# Patient Record
Sex: FEMALE | Race: WHITE | NOT HISPANIC OR LATINO | Employment: UNEMPLOYED | URBAN - METROPOLITAN AREA
[De-identification: names, ages, dates, MRNs, and addresses within clinical notes are randomized per-mention and may not be internally consistent; named-entity substitution may affect disease eponyms.]

---

## 2017-02-07 ENCOUNTER — ALLSCRIPTS OFFICE VISIT (OUTPATIENT)
Dept: OTHER | Facility: OTHER | Age: 62
End: 2017-02-07

## 2017-03-03 ENCOUNTER — LAB REQUISITION (OUTPATIENT)
Dept: LAB | Facility: HOSPITAL | Age: 62
End: 2017-03-03
Payer: COMMERCIAL

## 2017-03-03 ENCOUNTER — ALLSCRIPTS OFFICE VISIT (OUTPATIENT)
Dept: OTHER | Facility: OTHER | Age: 62
End: 2017-03-03

## 2017-03-03 DIAGNOSIS — Z01.419 ENCOUNTER FOR GYNECOLOGICAL EXAMINATION WITHOUT ABNORMAL FINDING: ICD-10-CM

## 2017-03-03 DIAGNOSIS — Z12.31 ENCOUNTER FOR SCREENING MAMMOGRAM FOR MALIGNANT NEOPLASM OF BREAST: ICD-10-CM

## 2017-03-03 DIAGNOSIS — Z11.59 ENCOUNTER FOR SCREENING FOR OTHER VIRAL DISEASES: ICD-10-CM

## 2017-03-03 PROCEDURE — 87624 HPV HI-RISK TYP POOLED RSLT: CPT | Performed by: OBSTETRICS & GYNECOLOGY

## 2017-03-03 PROCEDURE — G0145 SCR C/V CYTO,THINLAYER,RESCR: HCPCS | Performed by: OBSTETRICS & GYNECOLOGY

## 2017-03-07 LAB — HPV RRNA GENITAL QL NAA+PROBE: NORMAL

## 2017-03-08 ENCOUNTER — GENERIC CONVERSION - ENCOUNTER (OUTPATIENT)
Dept: OTHER | Facility: OTHER | Age: 62
End: 2017-03-08

## 2017-03-08 LAB
LAB AP GYN PRIMARY INTERPRETATION: NORMAL
Lab: NORMAL

## 2017-04-24 ENCOUNTER — HOSPITAL ENCOUNTER (OUTPATIENT)
Dept: MAMMOGRAPHY | Facility: HOSPITAL | Age: 62
Discharge: HOME | End: 2017-04-24
Attending: OBSTETRICS & GYNECOLOGY
Payer: COMMERCIAL

## 2017-04-24 DIAGNOSIS — Z12.31 ENCOUNTER FOR SCREENING MAMMOGRAM FOR MALIGNANT NEOPLASM OF BREAST: ICD-10-CM

## 2017-04-24 PROCEDURE — G0202 SCR MAMMO BI INCL CAD: HCPCS

## 2017-09-25 ENCOUNTER — DOCTOR'S OFFICE (OUTPATIENT)
Dept: URBAN - METROPOLITAN AREA CLINIC 137 | Facility: CLINIC | Age: 62
Setting detail: OPHTHALMOLOGY
End: 2017-09-25
Payer: COMMERCIAL

## 2017-09-25 ENCOUNTER — RX ONLY (RX ONLY)
Age: 62
End: 2017-09-25

## 2017-09-25 DIAGNOSIS — H52.4: ICD-10-CM

## 2017-09-25 PROCEDURE — 92004 COMPRE OPH EXAM NEW PT 1/>: CPT | Performed by: OPHTHALMOLOGY

## 2017-09-25 ASSESSMENT — REFRACTION_CURRENTRX
OD_ADD: +1.50
OS_OVR_VA: 20/
OD_OVR_VA: 20/
OS_SPHERE: +1.00
OD_OVR_VA: 20/
OD_AXIS: 005
OD_CYLINDER: +0.25
OS_AXIS: 149
OS_VPRISM_DIRECTION: PROGS
OS_OVR_VA: 20/
OS_ADD: +1.50
OS_OVR_VA: 20/
OD_VPRISM_DIRECTION: PROGS
OD_SPHERE: +0.75
OD_OVR_VA: 20/
OS_CYLINDER: +0.50

## 2017-09-25 ASSESSMENT — REFRACTION_OUTSIDERX
OS_SPHERE: +0.75
OD_ADD: +2.00
OD_SPHERE: +0.75
OS_VA1: 20/20
OD_VA2: 20/20(J1+)
OS_CYLINDER: +0.75
OS_VA3: 20/
OD_AXIS: 006
OS_AXIS: 162
OD_CYLINDER: +0.50
OU_VA: 20/
OS_ADD: +2.00
OD_VA1: 20/20
OS_VA2: 20/20(J1+)
OD_VA3: 20/

## 2017-09-25 ASSESSMENT — KERATOMETRY
OD_K1POWER_DIOPTERS: 43.75
OS_K1POWER_DIOPTERS: 43.75
OS_AXISANGLE_DEGREES: 133
OD_AXISANGLE_DEGREES: 090
OD_K2POWER_DIOPTERS: 43.75
OS_K2POWER_DIOPTERS: 44.00

## 2017-09-25 ASSESSMENT — REFRACTION_MANIFEST
OD_VA3: 20/
OS_VA1: 20/
OD_VA1: 20/
OD_VA1: 20/
OU_VA: 20/
OD_VA2: 20/
OS_VA1: 20/
OS_VA3: 20/
OS_VA2: 20/
OD_VA2: 20/
OU_VA: 20/
OS_VA2: 20/
OD_VA3: 20/
OS_VA3: 20/

## 2017-09-25 ASSESSMENT — AXIALLENGTH_DERIVED
OD_AL: 22.9806
OS_AL: 22.8454

## 2017-09-25 ASSESSMENT — REFRACTION_AUTOREFRACTION
OS_AXIS: 174
OS_CYLINDER: +0.75
OD_CYLINDER: +0.75
OS_SPHERE: +1.25
OD_AXIS: 004
OD_SPHERE: +1.00

## 2017-09-25 ASSESSMENT — VISUAL ACUITY
OD_BCVA: 20/20-2
OS_BCVA: 20/30

## 2017-09-25 ASSESSMENT — CONFRONTATIONAL VISUAL FIELD TEST (CVF)
OD_FINDINGS: FULL
OS_FINDINGS: FULL

## 2017-09-25 ASSESSMENT — SPHEQUIV_DERIVED
OD_SPHEQUIV: 1.375
OS_SPHEQUIV: 1.625

## 2017-10-05 ENCOUNTER — DOCTOR'S OFFICE (OUTPATIENT)
Dept: URBAN - METROPOLITAN AREA CLINIC 137 | Facility: CLINIC | Age: 62
Setting detail: OPHTHALMOLOGY
End: 2017-10-05
Payer: COMMERCIAL

## 2017-10-05 DIAGNOSIS — H52.4: ICD-10-CM

## 2017-10-05 PROCEDURE — 92310 CONTACT LENS FITTING OU: CPT | Performed by: OPHTHALMOLOGY

## 2017-10-05 ASSESSMENT — REFRACTION_MANIFEST
OS_VA1: 20/
OU_VA: 20/
OD_VA1: 20/
OS_VA1: 20/
OD_VA3: 20/
OD_VA1: 20/
OS_VA2: 20/
OD_VA2: 20/
OU_VA: 20/
OS_VA2: 20/
OS_VA3: 20/
OD_VA3: 20/
OS_VA3: 20/
OD_VA2: 20/

## 2017-10-05 ASSESSMENT — REFRACTION_OUTSIDERX
OD_VA2: 20/20(J1+)
OD_CYLINDER: +0.50
OS_VA1: 20/20
OD_SPHERE: +0.75
OS_ADD: +2.00
OS_VA3: 20/
OD_VA3: 20/
OD_VA1: 20/20
OD_AXIS: 006
OS_VA2: 20/20(J1+)
OS_CYLINDER: +0.75
OS_AXIS: 162
OD_ADD: +2.00
OS_SPHERE: +0.75
OU_VA: 20/

## 2017-10-05 ASSESSMENT — REFRACTION_CURRENTRX
OD_AXIS: 005
OD_VPRISM_DIRECTION: PROGS
OD_CYLINDER: +0.25
OD_OVR_VA: 20/
OD_ADD: +1.50
OD_OVR_VA: 20/
OS_ADD: +1.50
OS_CYLINDER: +0.50
OS_VPRISM_DIRECTION: PROGS
OS_SPHERE: +1.00
OS_OVR_VA: 20/
OS_AXIS: 149
OD_SPHERE: +0.75
OS_OVR_VA: 20/
OS_OVR_VA: 20/
OD_OVR_VA: 20/

## 2017-10-05 ASSESSMENT — VISUAL ACUITY
OS_BCVA: 20/20
OD_BCVA: 20/30

## 2017-10-05 ASSESSMENT — REFRACTION_AUTOREFRACTION
OD_CYLINDER: +0.75
OS_CYLINDER: +0.75
OS_SPHERE: +1.25
OD_SPHERE: +1.00
OS_AXIS: 174
OD_AXIS: 004

## 2017-10-05 ASSESSMENT — SPHEQUIV_DERIVED
OS_SPHEQUIV: 1.625
OD_SPHEQUIV: 1.375

## 2018-01-11 NOTE — H&P
Demographics     Zip Code: [address]     Admission Date: 3/22/2016     Procedure: Endovenous ablation therapy of incompetent LL veins     Discharge Status: Alive  Risk Factors     Weight Height Units: US (pound - inch)  Procedure     Primary Indication: Varicose veins of lower extremities with Pain  [454 8]     Procedure Side: Right     Procedure Duration: 0     Urgency: Elective

## 2018-01-11 NOTE — PROGRESS NOTES
Preliminary Nursing Report                Patient Information    Initial Encounter Entry Date:   3/2/2016 1:33 PM EST (Automated Transmission Automated Transmission)       Last Modified:   {Олег Balderrama}              Legal Name: Tayler Saucedo Number:        YOB: 1955        Age (years): 64        Gender: F        Body Mass Index (BMI): 28 kg/m2        Height: 63 in  Weight: 160 lbs (73 kgs)           Address:   [place] [id number]               Phone: [phone]        Email:        Ethnicity: Decline to State        Amish:        Marital Status:        Preferred Language: English        Race: Other Race                    Patient Insurance Information        Primary Insurance Information Carrier Name: {Primary  CarrierName}           Carrier Address:   {Primary  CarrierAddress}              Carrier Phone: {Primary  CarrierPhone}          Group Number: {Primary  GroupNumber}          Policy Number: {Primary  PolicyNumber}          Insured Name: {Primary  InsuredName}          Insured : {Primary  InsuredDOB}          Relationship to Insured: {Primary  RelationshiptoInsured}           Secondary Insurance Information Carrier Name: {Secondary  CarrierName}           Carrier Address:   {Secondary  CarrierAddress}              Carrier Phone: {Secondary  CarrierPhone}          Group Number: {Secondary  GroupNumber}          Policy Number: {Secondary  PolicyNumber}          Insured Name: {Secondary  InsuredName}          Insured : {Secondary  InsuredDOB}          Relationship to Insured: {Secondary  RelationshiptoInsured}                       Health Profile   Booking #:   Landon Noel #: [id number]               DOS: 2016    Surgery : PHLEB VEINS - EXTREM - TO 20    Add'l Procedures/Notes:     Surgery Risk: Minor          Precautions          Allergies    No Known Drug Allergies             Medications    Aspirin 81 MG Oral Tablet       B Complex Oral Capsule       Benadryl TABS       Citrucel Oral Powder       Famciclovir 500 MG Oral Tablet       Krill Oil CAPS       Osteo Bi-Flex Adv Joint Shield Oral Tablet       Oxycodone-Acetaminophen 5-325 MG Oral Tablet       Ranitidine HCl - 150 MG Oral Tablet       Vagifem 10 MCG Vaginal Tablet       Zoster (Zostavax)               Conditions    Atrophic vaginitis       GERD (gastroesophageal reflux disease)       Osteoarthritis       Postoperative state       Varicose veins of both lower extremities with other complications       Visit for screening mammogram       Well female exam with routine gynecological exam               Family History    None             Surgical History    None             Social History    Being A Social Drinker       Caffeine Use       Current Every Day Smoker       Current Smoker       Exercising Regularly                               Patient Instructions       ? NPO Instructions   The day before surgery it is recommended to have a light dinner at your usual time and you are allowed a light snack early in the evening  Do not eat anything heavy or eat a big meal after 7pm  Do not eat or drink anything after midnight prior to your surgery  If you are supposed to take any of your medications, do so with a sip of water  Failure to follow these instructions can lead to an increased risk of lung complications and may result in a delay or cancellation of your procedure  If you have any questions, contact your institution for further instructions  No candy, no gum, no mints, no chewing tobacco   Triggered by: Medical Procedure Risk         ? Antacids 34, 35  Please continue to take this medication on your normal schedule  If this is an oral medication and you take in the morning, you may do so with a sip of water  Triggered by: Ranitidine HCl - 150 MG Oral Tablet         ?  Aspirin (Blood Thinners) 112, 104, 105, 114, 113, 103, 110, 107, 108, 109, 111, 106  Please continue to take this medication on your normal schedule  If this is an oral medication and you take in the morning, you may do so with a sip of water  However, your surgeon may have you stop aspirin up to a week early if you are having intracranial, middle ear, posterior eye, spine surgery or prostate surgery  Triggered by: Aspirin 81 MG Oral Tablet         ? Opioids (Pain Medication) 62  Please continue the following medications, if needed, up to and including the day of surgery (with a sip of water)  Triggered by: Oxycodone-Acetaminophen 5-325 MG Oral Tablet         ? Smoking Cessation   Smoking before and after surgery can lead to complications  Patients who quit smoking at least eight weeks before surgery have complication rates almost as low as non-smokers  Smokers who can stop smoking 24 or 48 hours before surgery may also benefit from decreased amounts of nicotine and carbon monoxide in the body  For help quitting smoking, speak with your physician or contact the [facility] Mayela Colin or American [employer] Association  Please visit the following web address for assistance with quitting  [employer] be  [email or web address]-Topics/Qeh-Stc-pr-Quit-Smoking  aspx  Triggered by: Current Smoker, Current Every Day Smoker               Testing Considerations       No recommendations for this classification  Consultations       ? Pain Management Notes client, client  The patient has listed conditions or takes medication that may affect their pain management  Triggered by: Oxycodone-Acetaminophen 5-325 MG Oral Tablet, Age or Facility Rec               Miscellaneous Questions         Question: Are you able to walk up a flight of stairs, walk up a hill or do heavy housework WITHOUT having chest pain or shortness of breath? Answer: YES                   Allergies/Conditions/Medications Not Found        The following were not recognized by our system when generating the recommendations   Please consider if this would impact any preoperative protocols  ? Being A Social Drinker       ? Caffeine Use       ? Exercising Regularly       ? Visit for screening mammogram       ? Benadryl TABS       ? Yvonne Harps CAPS       ? Osteo Bi-Flex Adv Joint Shield Oral Tablet       ? Zoster (Zostavax)                  Appointment Information         Date:    03/22/2016        Location:    [facility]        Address:           Directions:                      Footnotes revision 14      ?? Denotes a free-text entry  Legal Disclaimer: Any and all recommendations and services provided herein are designed to assist in the preoperative care of the patient  Nothing contained herein is designed to replace, eliminate or alleviate the responsibility of the attending physician to supervise and determine the patient?s preoperative care and course of treatment  Failure to provide complete, accurate information may negatively impact the system?s ability to recommend the proper preoperative protocol  THE ATTENDING PHYSICIAN IS RESPONSIBLE TO REVIEW THE SUGGESTED PREOPERATIVE PROTOCOLS/COURSE OF TREATMENT AND PRESCRIBE THE FINAL COURSE OF PREOPERATIVE TREATMENT IN CONSULTATION WITH THE PATIENT  THE ePREOP SYSTEM AND ITS MATERIALS ARE PROVIDED ? AS IS? WITHOUT WARRANTY OF ANY KIND, EXPRESS OR IMPLIED, INCLUDING, BUT NOT LIMITED TO, WARRANTIES OF PERFORMANCE OR MERCHANTABILITY OR FITNESS FOR A PARTICULAR PURPOSE  PATIENT AND PHYSICIANS HEREBY AGREE THAT THEIR USE OF THE MATERIALS AND RESOURCES ACT AS A CONSENT TO RELEASE AND WAIVE ePREOP FROM ANY AND ALL CLAIMS OF WARRANTY, TORT OR CONTRACT LAW OF ANY KIND             Electronically signed by:Melo Shaffer MD  Mar  7 2016  5:45AM EST

## 2018-01-13 VITALS
SYSTOLIC BLOOD PRESSURE: 124 MMHG | BODY MASS INDEX: 31.71 KG/M2 | DIASTOLIC BLOOD PRESSURE: 68 MMHG | HEIGHT: 63 IN | WEIGHT: 179 LBS | HEART RATE: 76 BPM

## 2018-01-13 VITALS
SYSTOLIC BLOOD PRESSURE: 130 MMHG | RESPIRATION RATE: 16 BRPM | HEART RATE: 88 BPM | BODY MASS INDEX: 31.92 KG/M2 | WEIGHT: 180.13 LBS | DIASTOLIC BLOOD PRESSURE: 70 MMHG | HEIGHT: 63 IN

## 2018-01-14 NOTE — RESULT NOTES
Verified Results  (1) THIN PREP PAP WITH IMAGING [id number] 10:00AM Mirza Palm Order Number: [id number]     Test Name Result Flag Reference   LAB AP CASE REPORT (Report)     Gynecologic Cytology Report            Case: [id number]                  Authorizing Provider: Faisal Medeiros MD  Collected:      03/03/2017 1000        First Screen:     KULWINDER Jones   Received:      03/06/2017 3685        Specimen:  LIQUID-BASED PAP, SCREENING, Endocervical   HPV HIGH RISK RESULT (Report)     HPV, High Risk: HPV NEG, HPV16 NEG, HPV18 NEG      Other High Risk HPV Negative, HPV 16 Negative, HPV 18 Negative  HPV types: 16,18,31,33,35,39,45,51,52,56,58,59,66 and 68 DNA are undetectable or below the pre-set threshold  Roche???s FDA approved Georgi 4800 is utilized with strict adherence to the ???s instruction  manual to test for the presence of High-Risk HPV DNA, as well as HPV 16 and HPV 18  This instrument  has been validated by our laboratory and/or by the   A negative result does not preclude the presence of HPV infection because results depend on adequate  specimen collection, absence of inhibitors and sufficient DNA to be detected  Additionally, HPV negative  results are not intended to prevent women from proceeding to colposcopy if clinically warranted  Positive HPV test results indicate the presence of any one or more of the high risk types, but since patients  are often co-infected with low-risk types it does not rule out the presence of low-risk types in patients  with mixed infections  LAB AP GYN PRIMARY INTERPRETATION      Negative for intraepithelial lesion or malignancy  Electronically signed by KULWINDER Jones on 3/8/2017 at 2:27 PM   LAB AP GYN SPECIMEN ADEQUACY      Satisfactory for evaluation  Endocervical/transformation zone component present     LAB AP GYN ADDITIONAL INFORMATION (Report)     [employer]'s FDA approved ,  and ThinPrep Imaging System are   utilized with strict adherence to the 's instruction manual to   prepare gynecologic and non-gynecologic cytology specimens for the   production of ThinPrep slides as well as for gynecologic ThinPrep imaging  These processes have been validated by our laboratory and/or by the     The Pap test is not a diagnostic procedure and should not be used as the   sole means to detect cervical cancer  It is only a screening procedure to   aid in the detection of cervical cancer and its precursors  Both   false-negative and false-positive results have been experienced  Your   patient's test result should be interpreted in this context together with   the history and clinical findings

## 2018-01-15 NOTE — PROGRESS NOTES
Assessment    1  Encounter for preventive health examination (V70 0) (Z00 00)   2  Screening for lipid disorders (V77 91) (Z13 220)   3  Leg swelling (729 81) (M79 89)    Plan  GERD (gastroesophageal reflux disease)    · Famciclovir 500 MG Oral Tablet  Health Maintenance, Leg swelling    · (1) CBC/PLT/DIFF; Status:Active; Requested for:14Oct2016;    · (1) COMPREHENSIVE METABOLIC PANEL; Status:Active; Requested for:14Oct2016;    · (1) T4, FREE; Status:Active; Requested for:14Oct2016;    · (1) TSH; Status:Active; Requested for:14Oct2016;   Screening for lipid disorders    · (1) LIPID PANEL, FASTING; Status:Active; Requested for:14Oct2016;     Discussion/Summary  health maintenance visit Currently, she eats a healthy diet and has an adequate exercise regimen  the risks and benefits of cervical cancer screening were discussed cervical cancer screening is current cervical cancer screening is managed by [facility] Breast cancer screening: the risks and benefits of breast cancer screening were discussed, mammogram is current and breast cancer screening is managed by [facility]  Colorectal cancer screening: the risks and benefits of colorectal cancer screening were discussed and Declines colonoscopy    Had Cologuard which was negative  Osteoporosis screening: the risks and benefits of osteoporosis screening were discussed  The immunizations are up to date  She was advised to be evaluated by an optometrist  Advice and education were given regarding nutrition, aerobic exercise, weight bearing exercise and weight loss  Patient discussion: discussed with the patient  History of Present Illness  HM, Adult Female: The patient is being seen for a health maintenance evaluation  Social History: Household members include spouse and adult children  She is   Work status: working full time  The patient is a former cigarette smoker  She reports rare alcohol use  She has never used illicit drugs  General Health:  The patient's health since the last visit is described as good  She has regular dental visits  She denies vision problems  She denies hearing loss  Immunizations status: up to date  Lifestyle:  She consumes a diverse and healthy diet  She does not have any weight concerns  She exercises regularly  She does not use tobacco  She denies alcohol use  She denies drug use  Reproductive health:  she reports normal menses  Screening: cancer screening reviewed and current  metabolic screening reviewed and current  risk screening reviewed and current  Review of Systems    Constitutional: recent 15 lb weight gain, feeling tired and Occasionally feels like she is in a fog  "Don't feel sharp", but no fever, not feeling poorly and no chills  Eyes: No complaints of eye pain, no red eyes, no eyesight problems, no discharge, no dry eyes, no itching of eyes  ENT: no complaints of earache, no loss of hearing, no nose bleeds, no nasal discharge, no sore throat, no hoarseness  Cardiovascular: No complaints of slow heart rate, no fast heart rate, no chest pain, no palpitations, no leg claudication, no lower extremity edema  Respiratory: No complaints of shortness of breath, no wheezing, no cough, no SOB on exertion, no orthopnea, no PND  Gastrointestinal: No complaints of abdominal pain, no constipation, no nausea or vomiting, no diarrhea, no bloody stools  Genitourinary: No complaints of dysuria, no incontinence, no pelvic pain, no dysmenorrhea, no vaginal discharge or bleeding  Musculoskeletal: No complaints of arthralgias, no myalgias, no joint swelling or stiffness, no limb pain or swelling  Active Problems    1  Atrophic vaginitis (627 3) (N95 2)   2  GERD (gastroesophageal reflux disease) (530 81) (K21 9)   3  Leg swelling (729 81) (M79 89)   4  Lymphedema (457 1) (I89 0)   5  Osteoarthritis (715 90) (M19 90)   6  Postoperative state (V45 89) (Z98 890)   7   Varicose veins of both lower extremities with other complications (588 8) (P73 858)   8  Visit for screening mammogram (V76 12) (Z12 31)   9  Well female exam with routine gynecological exam (V72 31) (Z01 419)    Past Medical History    · History of Breast pain (611 71) (N64 4)   · History of Endometriosis (617 9) (N80 9)   · History of chest pain (V13 89) (L52 896)    The past medical history was reviewed and updated today  Surgical History    · History of Cervix Cryosurgery   · History of Endovenous Ablation Of Incompetent Vein Laser   · History of Oophorectomy   · History of Stab Phlebectomy Of Varicose Veins 10-20 Stab Incisions   · History of Stab Phlebectomy Varicose Veins Fewer Than 10 Stab Incisions   · History of Tonsillectomy With Adenoidectomy    The surgical history was reviewed and updated today  Family History  Mother    · Family history of Alzheimer Disease   · Family history of Diabetes Mellitus (V18 0)  Maternal Grandmother    · Family history of Diabetes Mellitus (V18 0)  Family History    · Family history of Diabetes Mellitus (V18 0)   · Family history of Heart Disease (V17 49)    The family history was reviewed and updated today  Social History    · Being A Social Drinker   · Caffeine Use   · Denied: History of Current Every Day Smoker   · Denied: History of Current Smoker   · Exercising Regularly   · Former smoker (A92 87) (P79 004)  The social history was reviewed and updated today  The social history was reviewed and is unchanged  Current Meds   1  Famciclovir 500 MG Oral Tablet; take 3 capsules at first sign of fever blister; Therapy: 88KDW9622 to (Last Rx:85Mpw6059)  Requested for: 23Joe9497 Ordered   2  Osteo Bi-Flex Adv Joint Shield Oral Tablet; Therapy: (Recorded:25Nov2013) to Recorded   3  Vagifem 10 MCG Vaginal Tablet; INSERT 1 TABLET VAGINALLY TWICE A WEEK AS   DIRECTED; Therapy: 65EEZ2477 to (Evaluate:22Dro3664)  Requested for: 96EGM5191; Last   Rx:94Ddg8553 Ordered   4  Zoster (Zostavax);  INJECT 0 5  ML Intramuscular; Therapy: 04DOH6634 to (Last Rx:10Mar2015) Ordered    Allergies    1  No Known Drug Allergies    Vitals   Recorded: 67FZO7393 48:26IT   Systolic 604   Diastolic 70   Heart Rate 60   Respiration 16   Height 5 ft 3 in   Weight 175 lb    BMI Calculated 31   BSA Calculated 1 83     Physical Exam    Constitutional   General appearance: No acute distress, well appearing and well nourished  Eyes   Conjunctiva and lids: No swelling, erythema or discharge  Pupils and irises: Equal, round and reactive to light  Ears, Nose, Mouth, and Throat   External inspection of ears and nose: Normal     Otoscopic examination: Tympanic membranes translucent with normal light reflex  Canals patent without erythema  Oropharynx: Normal with no erythema, edema, exudate or lesions  Pulmonary   Respiratory effort: No increased work of breathing or signs of respiratory distress  Auscultation of lungs: Clear to auscultation  Cardiovascular   Palpation of heart: Normal PMI, no thrills  Auscultation of heart: Normal rate and rhythm, normal S1 and S2, without murmurs  Examination of extremities for edema and/or varicosities: Normal     Abdomen   Abdomen: Non-tender, no masses  Liver and spleen: No hepatomegaly or splenomegaly  Lymphatic   Palpation of lymph nodes in neck: No lymphadenopathy  Musculoskeletal   Gait and station: Normal     Digits and nails: Normal without clubbing or cyanosis  Inspection/palpation of joints, bones, and muscles: Normal     Skin   Skin and subcutaneous tissue: Normal without rashes or lesions  Neurologic   Cranial nerves: Cranial nerves 2-12 intact  Reflexes: 2+ and symmetric  Sensation: No sensory loss  Psychiatric   Orientation to person, place, and time: Normal     Mood and affect: Normal        Health Management  History of Routine Gynecological Exam With Cervical Pap Smear   BREAST EXAM; every 1 year;  Last 84Lkp1954; Next Due: 29Nig0195; Active  COLONOSCOPY; every 10 years; Next Due: 52ZLH9658; Overdue  PELVIC EXAM; every 1 year; Last 86Mho8238; Next Due: 71Lkk7391;  Active    Future Appointments    Date/Time Provider Specialty Site   10/20/2016 02:45 PM Judene Gilford, MD Vascular Surgery [facility]     Signatures   Electronically signed by : DELMAR Fontana ; Oct 14 2016 11:23AM EST                       (Author)

## 2018-01-15 NOTE — H&P
Demographics     Zip Code: [address]     Admission Date: 3/22/2016     Procedure: Stab phlebectomy of varicose veins, 10-20 stab incisions     Discharge Status: Alive  Risk Factors     Weight Height Units: US (pound - inch)  Procedure     Primary Indication: Varicose veins of lower extremities with Pain  [454 8]     Procedure Side: Right     Procedure Duration: 0

## 2018-02-13 ENCOUNTER — TELEPHONE (OUTPATIENT)
Dept: FAMILY MEDICINE CLINIC | Facility: MEDICAL CENTER | Age: 63
End: 2018-02-13

## 2018-02-13 DIAGNOSIS — B00.1 HERPES SIMPLEX LABIALIS: Primary | ICD-10-CM

## 2018-02-13 RX ORDER — FAMCICLOVIR 500 MG/1
TABLET, FILM COATED ORAL
Qty: 3 TABLET | Refills: 5 | Status: SHIPPED | OUTPATIENT
Start: 2018-02-13 | End: 2018-03-09 | Stop reason: ALTCHOICE

## 2018-02-13 NOTE — TELEPHONE ENCOUNTER
Patient left a voice mail she called a couple weeks ago for Famciclovir 500mg but apparently if never got sent  Ir is not in her med list  I see in allscripts past meds  Please order it to [facility]  She says she get 3 tablets

## 2018-02-13 NOTE — TELEPHONE ENCOUNTER
Patient called back, she says she only uses 2 or 3 times a year as needed  Wants it sent to the [facility] on [facility] [address]  Added

## 2018-03-09 ENCOUNTER — OFFICE VISIT (OUTPATIENT)
Dept: OBGYN CLINIC | Facility: CLINIC | Age: 63
End: 2018-03-09
Payer: COMMERCIAL

## 2018-03-09 VITALS — WEIGHT: 186 LBS | DIASTOLIC BLOOD PRESSURE: 68 MMHG | SYSTOLIC BLOOD PRESSURE: 118 MMHG | BODY MASS INDEX: 32.95 KG/M2

## 2018-03-09 DIAGNOSIS — Z01.419 WELL WOMAN EXAM WITH ROUTINE GYNECOLOGICAL EXAM: ICD-10-CM

## 2018-03-09 DIAGNOSIS — Z12.31 ENCOUNTER FOR SCREENING MAMMOGRAM FOR MALIGNANT NEOPLASM OF BREAST: Primary | ICD-10-CM

## 2018-03-09 PROBLEM — B00.1 HERPES SIMPLEX LABIALIS: Status: RESOLVED | Noted: 2018-02-13 | Resolved: 2018-03-09

## 2018-03-09 PROCEDURE — S0612 ANNUAL GYNECOLOGICAL EXAMINA: HCPCS | Performed by: OBSTETRICS & GYNECOLOGY

## 2018-03-09 RX ORDER — MELATONIN
1000 DAILY
COMMUNITY

## 2018-03-09 NOTE — PROGRESS NOTES
ASSESSMENT & PLAN: Maite Tobias is a 63 y o  Y6J0841 with normal gynecologic exam     1   Routine well woman exam done today  2  Pap and HPV:  The patient's last pap was  and was normal   Pap and cotesting was not done today  Current ASCCP Guidelines reviewed  Repeat pap in   3  Mammogram ordered  4  Colonoscopy- declined  4  The following were reviewed in today's visit: breast self exam, mammography screening ordered, use and side effects of HRT, menopause, osteoporosis, adequate intake of calcium and vitamin D, exercise, healthy diet and patient declined a colonoscopy      CC:  Annual Gynecologic Examination    HPI: Maite Tobias is a 63 y o  H2E5972 who presents for annual gynecologic examination  She has the following concerns:  none    Health Maintenance:    She exercises 4 days per week with cardio  She wears her seatbelt routinely  She does not perform regular monthly self breast exams  She feels safe at home  Patients does follow a weightwatchers diet  Her last pap was  and was normal  Last mammogram: was 2017  Last colonoscopy: was never performed    Past Medical History:   Diagnosis Date    Abnormal Pap smear of cervix     GERD (gastroesophageal reflux disease)     Phlebitis     Varicose vein        Past Surgical History:   Procedure Laterality Date    CERVIX SURGERY      GYNECOLOGIC CRYOSURGERY      OOPHORECTOMY      OOPHORECTOMY Right     [place] PHLEB VEINS - EXTREM - TO 20 Right 3/22/2016    Procedure: Right leg multiple stab phlebectomies ;  Surgeon: Nolan Blount MD;  [facility];  Service: Vascular    TONSILLECTOMY AND ADENOIDECTOMY      VARICOSE VEIN SURGERY         Past OB/Gyn History:  OB History      Para Term  AB Living    2 2 2     2    SAB TAB Ectopic Multiple Live Births            2         No LMP recorded   Patient is postmenopausal    History of sexually transmitted infection No  History of abnormal pap smears Yes had cryo in early 90's  No problems since    Patient is currently sexually active  heterosexual and  monogamous  Occasionally with   Family History   Problem Relation Age of Onset    Diabetes Mother     Diabetes Brother        Social History:  Social History     Social History    Marital status: /Civil Union     Spouse name: N/A    Number of children: N/A    Years of education: N/A     Occupational History    Not on file  Social History Main Topics    Smoking status: Former Smoker    Smokeless tobacco: Never Used    Alcohol use No    Drug use: No    Sexual activity: Yes     Partners: Male     Other Topics Concern    Not on file     Social History Narrative    No narrative on file     Presently lives with   Patient is   Patient is currently retired  No Known Allergies    Current Outpatient Prescriptions:     calcium acetate (PHOSLO) 667 mg capsule, Take 1,334 mg by mouth 3 (three) times a day with meals, Disp: , Rfl:     cholecalciferol (VITAMIN D3) 1,000 units tablet, Take 1,000 Units by mouth daily, Disp: , Rfl:     TURMERIC PO, Take by mouth, Disp: , Rfl:     Review of Systems:  A complete review of systems was performed and was negative, except as listed  Physical Exam:  /68   Wt 84 4 kg (186 lb)   BMI 32 95 kg/m²    GEN: The patient was alert and oriented x3, pleasant well-appearing female in no acute distress  HEENT:  Unremarkable, no anterior or posterior lymphadenopathy, no thyromegaly  CV:  RRR, no murmurs  RESP:  Clear to auscultation bilaterally  BREAST:  Symmetric breasts with no palpable breast masses or obvious breast lesions  She has no retractions or nipple discharge  She has no axillary abnormalities or palpable masses  Self breast exam is taught    ABD:  Soft, nontender, nondistended, normoactive bowel sounds,   EXT:  WWP, nontender, no edema  BACK:  No CVA tenderness, no tenderness to palpation along spine  PELVIC: Normal appearing external female genitalia, atrophic vaginal epithelium, No discharge  Cervix present   Bimanual: absent CMT,  normal uterus, non-tender  No palpable adnexal masses

## 2018-03-15 ENCOUNTER — TELEPHONE (OUTPATIENT)
Dept: OBGYN CLINIC | Facility: CLINIC | Age: 63
End: 2018-03-15

## 2018-05-03 ENCOUNTER — OFFICE VISIT (OUTPATIENT)
Dept: FAMILY MEDICINE CLINIC | Facility: MEDICAL CENTER | Age: 63
End: 2018-05-03
Payer: COMMERCIAL

## 2018-05-03 VITALS
BODY MASS INDEX: 31.81 KG/M2 | DIASTOLIC BLOOD PRESSURE: 70 MMHG | SYSTOLIC BLOOD PRESSURE: 100 MMHG | HEART RATE: 76 BPM | WEIGHT: 179.6 LBS | RESPIRATION RATE: 16 BRPM

## 2018-05-03 DIAGNOSIS — R29.898 RIGHT LEG WEAKNESS: Primary | ICD-10-CM

## 2018-05-03 DIAGNOSIS — R20.0 RIGHT ARM NUMBNESS: ICD-10-CM

## 2018-05-03 PROCEDURE — 99214 OFFICE O/P EST MOD 30 MIN: CPT | Performed by: FAMILY MEDICINE

## 2018-05-03 RX ORDER — FAMCICLOVIR 500 MG/1
TABLET, FILM COATED ORAL
COMMUNITY
End: 2019-07-18 | Stop reason: SDUPTHER

## 2018-05-03 NOTE — PROGRESS NOTES
Patient is here today because she has a numbness and weakness in her right hand  This has been coming on over the course of a year so  Is getting difficult for her to right or grasp things firmly  She is also complaining of some right leg weakness and describes a foot slap  The leg is very easily fatigued  She has fallen a couple of times, usually by tripping because her leg does not come up enough to clear the step, etc  She also has some difficulty putting on pants with lifting of the right leg up to get it in to the pant leg  She also finds that she has occasional blurry vision  And at times she also finds that she is having problems with balance  The only other symptom that she mentions besides these is blurry vision and this is intermittent  She does not complain of paresthesias or pain  /70 (Cuff Size: Large)   Pulse 76   Resp 16   Wt 81 5 kg (179 lb 9 6 oz)   BMI 31 81 kg/m²     Physical Exam   Constitutional: She is oriented to person, place, and time  Vital signs are normal  She appears well-developed and well-nourished  She is cooperative  HENT:   Head: Normocephalic  Right Ear: Tympanic membrane and ear canal normal    Left Ear: Tympanic membrane and ear canal normal    Nose: Nose normal  No mucosal edema or rhinorrhea  Mouth/Throat: Uvula is midline, oropharynx is clear and moist and mucous membranes are normal  No oropharyngeal exudate  No tonsillar exudate  Eyes: Conjunctivae, EOM and lids are normal  Pupils are equal, round, and reactive to light  Neck: Trachea normal  Carotid bruit is not present  No thyromegaly present  Cardiovascular: Normal rate, regular rhythm, S1 normal, S2 normal, normal heart sounds and normal pulses  No murmur heard  Pulmonary/Chest: Effort normal and breath sounds normal  No respiratory distress  She has no wheezes  She has no rhonchi  She has no rales  Abdominal: Soft   Normal appearance and bowel sounds are normal  There is no hepatosplenomegaly  There is no tenderness  No hernia  Lymphadenopathy:     She has no cervical adenopathy  Neurological: She is alert and oriented to person, place, and time  She has normal strength and normal reflexes  She displays normal reflexes  A sensory deficit ( subtle on reduction in light touch in the right hand ) is present  No cranial nerve deficit (  Cranial nerves 2-12 were tested no deficit)  She exhibits abnormal muscle tone ( patient has of Weakness of the flexor muscles of the right lower extremity  She can raise it against gravity but not against resistance  Left leg is normal)  Coordination ( patient does haveSome difficulty standing on 1 ft  Basically equal bilaterally) abnormal    Skin: Skin is warm, dry and intact  No rash noted  Psychiatric: She has a normal mood and affect  Her speech is normal and behavior is normal  Cognition and memory are normal    Nursing note and vitals reviewed

## 2018-05-07 ENCOUNTER — HOSPITAL ENCOUNTER (OUTPATIENT)
Dept: MAMMOGRAPHY | Facility: HOSPITAL | Age: 63
Discharge: HOME | End: 2018-05-07
Attending: OBSTETRICS & GYNECOLOGY
Payer: COMMERCIAL

## 2018-05-07 DIAGNOSIS — Z12.31 ENCOUNTER FOR SCREENING MAMMOGRAM FOR MALIGNANT NEOPLASM OF BREAST: ICD-10-CM

## 2018-05-07 PROCEDURE — 77067 SCR MAMMO BI INCL CAD: CPT

## 2018-05-10 ENCOUNTER — TELEPHONE (OUTPATIENT)
Dept: OBGYN CLINIC | Facility: CLINIC | Age: 63
End: 2018-05-10

## 2018-05-10 NOTE — TELEPHONE ENCOUNTER
Spoke with patient  Advised samples and sample card will be at   [facility]  Verbalized understanding

## 2018-05-10 NOTE — TELEPHONE ENCOUNTER
----- Message from Rj Montesinos MD sent at 5/9/2018  1:13 PM EDT -----  Regarding: FW: Prescription Question  Contact: [phone]  Can we send her samples of the premarin cream with the sample card  I will send her an RX for it    ----- Message -----  From: Angeles Piedra RN  Sent: 5/9/2018  12:01 PM  To: Rj Montesinos MD  Subject: FW: Prescription Question                            ----- Message -----  From: Gabrielle Kahn  Sent: 5/9/2018   9:16 AM  To: , #  Subject: Prescription Question                            I would like to try a low dose estrogen cream for vaginal dryness  [facility]  Thanks!

## 2018-06-18 ENCOUNTER — HOSPITAL ENCOUNTER (OUTPATIENT)
Dept: RADIOLOGY | Facility: CLINIC | Age: 63
Discharge: HOME | End: 2018-06-18
Payer: COMMERCIAL

## 2018-06-18 DIAGNOSIS — R29.898 RIGHT LEG WEAKNESS: ICD-10-CM

## 2018-06-18 PROCEDURE — 95886 MUSC TEST DONE W/N TEST COMP: CPT | Performed by: PHYSICAL MEDICINE & REHABILITATION

## 2018-06-18 PROCEDURE — 95908 NRV CNDJ TST 3-4 STUDIES: CPT | Performed by: PHYSICAL MEDICINE & REHABILITATION

## 2018-06-18 PROCEDURE — 95860 NEEDLE EMG 1 EXTREMITY: CPT | Performed by: PHYSICAL MEDICINE & REHABILITATION

## 2018-06-18 NOTE — PROCEDURES
Procedures      Electromyogram and Nerve Conduction Velocity Procedure Note    HX:  53-year-old female referred by primary care because of complaints of right-sided leg weakness  She reports a gradual onset over several years of intermittent weakness for left hip flexors, difficulty raising leg and clearing her foot  Denies any symptoms in the left hemibody  She also has curious symptoms of global numbness of the right hand and neither the symptoms are present with any radicular complaints from the neck or the back  She has remote history of back problems treated conservatively at that time she had symptoms into the right hip but not currently  She denies any bowel or bladder incontinence only complains of some intermittent blurriness of revision which last minutes at a time  She presents today for electrodiagnostic study     PMH:   She denies any ongoing medical problems or use of any prescription medications  Exam:   On exam there is mildly increased tone at the right ankle jerk but no or else  Plantar responses downgoing, Fabiola sign is negative I detect no focal or lateralizing motor weakness except for some discomfort with hip flexion on the right side  There is marked palpatory tenderness throughout the soft tissues of the hip girdle including the greater trochanteric bursa and ITB  There are no fasciculations or tremors noted      Procedure:  Verbal informed consent was obtained as with all electrodiagnostic medicine patients  As with all patients this patient was informed that they may terminate the  examine at any time  Patient tolerated the procedure well with no adverse effects reported or observed  Findings:  Please see the [employer] data printout  Nerve conduction studies were normal for the right sural sensory fibers, the right peroneal motor fibers, the bilateral tibial H reflexes    Electromyography:  Monopolar needle exploration did it reveal increased insertional activity in the form of trains of positive waves in the right lumbar paraspinal muscles  Left lumbar paraspinal muscles are unremarkable  No abnormalities are found the following muscles the right most symptomatic limb:  Gluteus medius, vastus lateralis, psoas, tibialis anterior, peroneus biceps femoris longus, biceps femoris-long head and gastrocnemius  Motor units were normal in all muscles as was recruitment  Specific be the iliopsoas muscle recruitment was full  Conclusion:   1  The only abnormality on today's study is some mild root level irritation on the right side which is nonspecific  At this time there is no signs of any acute findings to suggest or indicate a lumbar radiculopathy or plexopathy in the right lower extremity  There is no evidence of any underlying large fiber polyneuropathy no evidence in the muscles tested today to suggest a motor neuron disease  There is no evidence of any myopathic process  Recommendations:     Consider electrodiagnostic study of the right upper extremity due to complaints of sensory disturbance  MRI scanning of the lumbar spine may be helpful further evaluating today's mildly abnormal EMG suggesting root level issues  However due to the right hemibody complaints of may be reasonable to pursue cervical spine imaging as well

## 2018-06-21 ENCOUNTER — TELEPHONE (OUTPATIENT)
Dept: FAMILY MEDICINE CLINIC | Facility: MEDICAL CENTER | Age: 63
End: 2018-06-21

## 2018-06-24 NOTE — TELEPHONE ENCOUNTER
Well unfortunately the nerve conduction study really did not give us any information as it was mostly normal   At this point if we are to pursue this I think will have to refer her to a neurologist   If she is agreeable to do that I will be happy to make it happen thank you

## 2018-06-25 NOTE — TELEPHONE ENCOUNTER
I spoke with Fior Rebollar and she would like to see a GOOD neurologist, says she did convey that she wanted to get established with one at her last visit with you  Also she wanted you to know the person who did her EMG study asked her if she was having any low back pain because he noted some inflammatory issue with that area  She has not had any back pain  Please place a referral for her

## 2018-07-05 ENCOUNTER — TELEPHONE (OUTPATIENT)
Dept: FAMILY MEDICINE CLINIC | Facility: MEDICAL CENTER | Age: 63
End: 2018-07-05

## 2018-07-23 ENCOUNTER — TELEPHONE (OUTPATIENT)
Dept: FAMILY MEDICINE CLINIC | Facility: MEDICAL CENTER | Age: 63
End: 2018-07-23

## 2018-07-23 DIAGNOSIS — R29.898 RIGHT LEG WEAKNESS: Primary | ICD-10-CM

## 2018-07-23 NOTE — TELEPHONE ENCOUNTER
Message left that I placed a referral into her chart  She is to call us if she has not heard back after 3 to 4 days

## 2018-07-23 NOTE — TELEPHONE ENCOUNTER
Pt called regarding seeing a neurologist  She said she left several messages in my chart and regarding the suggestion of her seeing neurologist but never heard back  Would you please call her with any update  Thanks

## 2018-07-26 NOTE — TELEPHONE ENCOUNTER
I called [facility] and held on the line for 20 minutes and was not given an option to leave a message for a call back  Will need to try an reach them again when time allows

## 2018-07-26 NOTE — TELEPHONE ENCOUNTER
Please get the ball rolling to send her to [facility]    When we get the form, attach my last not from may and the EMG report    Once that is underway let her know   Thanx    Diagnosis is Subjective and Objective Weakness in RUE and RLE for a year    Also loss of sensation in right hand, handwriting becoming poor       Thanx

## 2018-08-01 ENCOUNTER — TELEPHONE (OUTPATIENT)
Dept: FAMILY MEDICINE CLINIC | Facility: MEDICAL CENTER | Age: 63
End: 2018-08-01

## 2018-08-01 NOTE — TELEPHONE ENCOUNTER
Rosemarie Irby is aware we are still trying to get her into a [facility] neurologist sooner then Andrew sawyer was able to see her end of October    Will call  Neuro Thursday morning after 8am at [phone]  [place]

## 2018-08-02 NOTE — TELEPHONE ENCOUNTER
I spoke with Anne Marie Florence at [facility] and provided them with all the patient information  They have an access team that goes forward to get the patient scheduled generally within a months time when its a doctor to doctor referral  They will notify the patient when an appointment is available and then notify us that they have reached out to the patient and scheduled an appointment for them  At that time we can provide any necessary records or forms that they will request   I have contacted Loretta Thomas and she is aware to expect a call from their office in the near future

## 2018-09-10 ENCOUNTER — TELEPHONE (OUTPATIENT)
Dept: FAMILY MEDICINE CLINIC | Facility: MEDICAL CENTER | Age: 63
End: 2018-09-10

## 2018-09-10 NOTE — TELEPHONE ENCOUNTER
----- Message from Grace Martinez sent at 9/10/2018  1:46 PM EDT -----  Regarding: RE: FW: Test Results Question  Contact: [phone]  Happy with her so far  What about the thyroid nodule? Will you address that or Dr Srinath Gregorio?  ----- Message -----  From: Nidia Oppenheim, MD  Sent: 9/10/2018  1:39 PM EDT  To: Grace Martinez  Subject: RE: FW: Test Results Question  Did you log in through [address] [place] [address]? I think you should review that with your neurologist? Do you have a follow up? Were you happy with that physician?    ----- Message -----  From: Grace Martinez  Sent: 9/9/2018   7:54 AM  To: [facility]  Subject: Test Results Question                            I have test results re: MRI's  There is no reult component?

## 2018-09-11 DIAGNOSIS — E04.1 THYROID NODULE: Primary | ICD-10-CM

## 2018-09-18 ENCOUNTER — HOSPITAL ENCOUNTER (OUTPATIENT)
Dept: ULTRASOUND IMAGING | Facility: HOSPITAL | Age: 63
Discharge: HOME | End: 2018-09-18
Payer: COMMERCIAL

## 2018-09-18 DIAGNOSIS — E04.1 THYROID NODULE: ICD-10-CM

## 2018-09-18 PROCEDURE — 76536 US EXAM OF HEAD AND NECK: CPT

## 2018-09-20 ENCOUNTER — TELEPHONE (OUTPATIENT)
Dept: FAMILY MEDICINE CLINIC | Facility: MEDICAL CENTER | Age: 63
End: 2018-09-20

## 2018-09-20 DIAGNOSIS — Z12.11 SCREENING FOR MALIGNANT NEOPLASM OF COLON: Primary | ICD-10-CM

## 2018-09-20 NOTE — TELEPHONE ENCOUNTER
Pt has an appointment with GI (Dr Bailee Pak) on Tuesday September 25th  They are requesting a doctor to doctor referral  She is being seen for a colon consult  Can you please place in system? They will be able to see it in EPIC

## 2018-09-21 ENCOUNTER — TELEPHONE (OUTPATIENT)
Dept: FAMILY MEDICINE CLINIC | Facility: MEDICAL CENTER | Age: 63
End: 2018-09-21

## 2018-09-21 DIAGNOSIS — E04.1 RIGHT THYROID NODULE: Primary | ICD-10-CM

## 2018-09-21 NOTE — TELEPHONE ENCOUNTER
----- Message from Tia Aponte MA sent at 9/21/2018  1:58 PM EDT -----  Regarding: FW: Test Results Question  Contact: [phone]      ----- Message -----  From: Almaz Keating MD  Sent: 9/21/2018   1:51 PM  To: Tia Aponte MA  Subject: FW: Test Results Question                        Please call her    The order is in for an U/S guided Bx of the thyroid nodule    She can now call central and schedule it     ----- Message -----  From: Tia Aponte MA  Sent: 9/21/2018   7:40 AM  To: Almaz Keating MD  Subject: FW: Test Results Question                            ----- Message -----  From: Gabrielle Kahn  Sent: 9/21/2018   6:32 AM  To: Pending sale to [facility]  Subject: RE: Test Results Question                        Ok   Can I get a copy of report? It does not show up in my test results  Let me know how to proceed with needle biospy  Thanks   ----- Message -----  From: Almaz Keating MD  Sent: 9/20/2018  7:47 PM EDT  To: Gabrielle Kahn  Subject: RE: Test Results Question  Yes it shows the nodule  They suggest a needle biopsy to be sure it is nothing to worry about  I can put that order in if you like  Basically, using ultrasound guidance they can take a sample  It is pretty routine      ----- Message -----  From: Gabrielle Kahn  Sent: 9/20/2018   2:42 PM  To: Pending sale to [facility]  Subject: Test Results Question                            Results US Thyroid? Done Tues , 9/18

## 2018-09-25 ENCOUNTER — OFFICE VISIT (OUTPATIENT)
Dept: GASTROENTEROLOGY | Facility: AMBULARY SURGERY CENTER | Age: 63
End: 2018-09-25
Payer: COMMERCIAL

## 2018-09-25 VITALS
DIASTOLIC BLOOD PRESSURE: 62 MMHG | HEIGHT: 64 IN | SYSTOLIC BLOOD PRESSURE: 118 MMHG | WEIGHT: 183.2 LBS | TEMPERATURE: 97.8 F | BODY MASS INDEX: 31.28 KG/M2 | HEART RATE: 73 BPM | RESPIRATION RATE: 18 BRPM

## 2018-09-25 DIAGNOSIS — Z12.11 SCREENING FOR MALIGNANT NEOPLASM OF COLON: ICD-10-CM

## 2018-09-25 DIAGNOSIS — K21.9 GASTROESOPHAGEAL REFLUX DISEASE, ESOPHAGITIS PRESENCE NOT SPECIFIED: Primary | ICD-10-CM

## 2018-09-25 PROCEDURE — 99244 OFF/OP CNSLTJ NEW/EST MOD 40: CPT | Performed by: INTERNAL MEDICINE

## 2018-09-25 NOTE — LETTER
September 25, 2018     Arnold Loo MD  [address] [facility],[address]    Patient: Nicole Peñaloza   YOB: 1955   Date of Visit: 9/25/2018       Dear Dr Dawson Arana: Thank you for referring Nicole Peñaloza to me for evaluation  Below are my notes for this consultation  If you have questions, please do not hesitate to call me  I look forward to following your patient along with you  Sincerely,        Cheo Ames MD        CC: No Recipients  Cheo Ames MD  9/25/2018 10:37 AM  Sign at close encounter  Consultation - [address] [facility]  Nicole Peñaloza 66 y o  female MRN: [id number]  Unit/Bed#:  Encounter: [id number]        Consults    ASSESSMENT/PLAN:   1  Colon cancer screening-average risk, no family history of colon cancer, change in bowel habits, hematochezia or abdominal pain  Labs were done recently at [facility], these were reviewed  Normal hemoglobin, liver function tests are normal   TSH is normal   -will schedule average risk screening colonoscopy at this time  Patient was explained about  the risks and benefits of the procedure  Risks including but not limited to bleeding, infection, perforation were explained in detail  Also explained about less than 100% sensitivity with the exam and other alternatives  2   GERD-rare symptoms over the past year, after controlled with as needed H2 blockers  No other alarm symptoms  Therefore will hold off on endoscopic evaluation  If symptoms become persistent, would recommend EGD to assess for Ulloa's  Patient was explained about the lifestyle and dietary modifications  Advised to avoid fatty foods, chocolates, caffeine, alcohol and any other triggering foods    Avoid eating for at least 3 hours before going to bed                     ______________________________________________________________________    Reason for Consult / Principal Problem: [unfilled]    HPI: Bree Mari is a 66y o  year old female presents for colon cancer screening  Patient states that she has never had a screening colonoscopy  She denies family history of GI malignancy, change in bowel habits, hematochezia, abdominal pain  She has occasional heartburn symptoms for which she takes over-the-counter Zantac  She denies dysphagia, hematemesis, melena or odynophagia  She states that her heartburn symptoms are very rare and after precipitated by certain dietary indiscretions  She has never had an EGD  Review of Systems: The remainder of the review of systems was negative except for the pertinent positives noted in HPI       Historical Information   Past Medical History:   Diagnosis Date    Abnormal Pap smear of cervix 1990    Endometriosis     GERD (gastroesophageal reflux disease)     Phlebitis     Thyroid nodule 9/11/2018    Varicose vein      Past Surgical History:   Procedure Laterality Date    CERVIX SURGERY      ENDOVENOUS ABLATION SAPHENOUS VEIN W/ LASER      of incompetent vein laser last assessed 10/19/2015    GYNECOLOGIC CRYOSURGERY      early 's cervix per allscripts    OOPHORECTOMY      OOPHORECTOMY Right     NH PHLEB VEINS - EXTREM - TO 20 Right 3/22/2016    Procedure: Right leg multiple stab phlebectomies ;  Surgeon: Salina Sánchez MD;  [facility]: [facility];  Service: Vascular    TONSILLECTOMY AND ADENOIDECTOMY      age 13 per allscripts    VARICOSE VEIN SURGERY       Social History   History   Alcohol Use No     Comment: social per allscripts     History   Drug Use No     History   Smoking Status    Former Smoker   Smokeless Tobacco    Never Used     Comment: denied history of current every day smoker, denied history of current smoker per allscripts     Family History   Problem Relation Age of Onset    Diabetes Mother     Dementia Mother     Cancer Mother     Alzheimer's disease Mother     Diabetes Brother     Alcohol abuse Father     Diabetes Maternal Grandmother     Diabetes Other     Heart disease Other        Meds/Allergies       (Not in a hospital admission)  No current facility-administered medications for this visit  No Known Allergies    Objective     Blood pressure 118/62, pulse 73, temperature 97 8 °F (36 6 °C), resp  rate 18, height 5' 4" (1 626 m), weight 83 1 kg (183 lb 3 2 oz)  [unfilled]    PHYSICAL EXAM     GEN: well nourished, well developed, no acute distress  HEENT: anicteric, MMM, no cervical or supraclavicular lymphadenopathy  CV: RRR, no m/r/g  CHEST: CTA b/l, no WRR  ABD: +BS, soft, NT/ND, no hepatosplenomegaly  EXT: no c/c/e  SKIN: no rashes,  NEURO: aaox3    Lab Results:   No visits with results within 1 Day(s) from this visit  Latest known visit with results is:   Lab Requisition on 03/03/2017   Component Date Value    Case Report 03/03/2017                      Value:Gynecologic Cytology Report                       Case: [id number]                                  Authorizing Provider:  Chavez Escalona MD   Collected:           03/03/2017 1000              First Screen:          KULWINDER Desai     Received:            03/06/2017 0402              Specimen:    LIQUID-BASED PAP, SCREENING, Endocervical                                                  High Risk HPV Result 03/03/2017                      Value:HPV, High Risk: HPV NEG, HPV16 NEG, HPV18 NEG      Other High Risk HPV Negative, HPV 16 Negative, HPV 18 Negative  HPV types: 16,18,31,33,35,39,45,51,52,56,58,59,66 and 68 DNA are undetectable or below the pre-set threshold  Roches FDA approved Georgi 4800 is utilized with strict adherence to the s instruction  manual to test for the presence of High-Risk HPV DNA, as well as HPV 16 and HPV 18  This instrument  has been validated by our laboratory and/or by the     A negative result does not preclude the presence of HPV infection because results depend on adequate  specimen collection, absence of inhibitors and sufficient DNA to be detected  Additionally, HPV negative  results are not intended to prevent women from proceeding to colposcopy if clinically warranted  Positive HPV test results indicate the presence of any one or more of the high risk types, but since patients  are often co-infected with low-risk types it does not rule out the presence of low-risk                           types in patients  with mixed infections   Primary Interpretation 03/03/2017 Negative for intraepithelial lesion or malignancy     Specimen Adequacy 03/03/2017 Satisfactory for evaluation  Endocervical/transformation zone component present   Additional Information 03/03/2017                      Value: This result contains rich text formatting which cannot be displayed here   HPV, High Risk 03/03/2017 HPV NEG, HPV16 NEG, HPV18 NEG      Imaging Studies: I have personally reviewed pertinent films in PACS              Answers for HPI/ROS submitted by the patient on 9/24/2018   Abdominal pain  anorexia: No  arthralgias: No  belching: No  constipation: No  diarrhea: No  dysuria: No  fever: No  flatus: No  frequency: No  headaches: No  hematochezia: No  hematuria: No  melena: No  myalgias: No  nausea:  No  weight loss: No  vomiting: No

## 2018-09-25 NOTE — PROGRESS NOTES
Consultation - [address] [facility]  Darlene Mcgrath 66 y o  female MRN: [id number]  Unit/Bed#:  Encounter: [id number]        Consults    ASSESSMENT/PLAN:   1  Colon cancer screening-average risk, no family history of colon cancer, change in bowel habits, hematochezia or abdominal pain  Labs were done recently at [facility], these were reviewed  Normal hemoglobin, liver function tests are normal   TSH is normal   -will schedule average risk screening colonoscopy at this time  Patient was explained about  the risks and benefits of the procedure  Risks including but not limited to bleeding, infection, perforation were explained in detail  Also explained about less than 100% sensitivity with the exam and other alternatives  2   GERD-rare symptoms over the past year, after controlled with as needed H2 blockers  No other alarm symptoms  Therefore will hold off on endoscopic evaluation  If symptoms become persistent, would recommend EGD to assess for Ulloa's  Patient was explained about the lifestyle and dietary modifications  Advised to avoid fatty foods, chocolates, caffeine, alcohol and any other triggering foods  Avoid eating for at least 3 hours before going to bed                     ______________________________________________________________________    Reason for Consult / Principal Problem: [unfilled]    HPI: Darlene Mcgrath is a 66y o  year old female presents for colon cancer screening  Patient states that she has never had a screening colonoscopy  She denies family history of GI malignancy, change in bowel habits, hematochezia, abdominal pain  She has occasional heartburn symptoms for which she takes over-the-counter Zantac  She denies dysphagia, hematemesis, melena or odynophagia  She states that her heartburn symptoms are very rare and after precipitated by certain dietary indiscretions  She has never had an EGD  Review of Systems:   The remainder of the review of systems was negative except for the pertinent positives noted in HPI  Historical Information   Past Medical History:   Diagnosis Date    Abnormal Pap smear of cervix 1990    Endometriosis     GERD (gastroesophageal reflux disease)     Phlebitis     Thyroid nodule 9/11/2018    Varicose vein      Past Surgical History:   Procedure Laterality Date    CERVIX SURGERY      ENDOVENOUS ABLATION SAPHENOUS VEIN W/ LASER      of incompetent vein laser last assessed 10/19/2015    GYNECOLOGIC CRYOSURGERY      early 's cervix per allscripts    OOPHORECTOMY      OOPHORECTOMY Right     NV PHLEB VEINS - EXTREM - TO 20 Right 3/22/2016    Procedure: Right leg multiple stab phlebectomies ;  Surgeon: Maury Montoya MD;  Location: [facility];  Service: Vascular    TONSILLECTOMY AND ADENOIDECTOMY      age 13 per allscripts    VARICOSE VEIN SURGERY       Social History   History   Alcohol Use No     Comment: social per allscripts     History   Drug Use No     History   Smoking Status    Former Smoker   Smokeless Tobacco    Never Used     Comment: denied history of current every day smoker, denied history of current smoker per allscripts     Family History   Problem Relation Age of Onset    Diabetes Mother     Dementia Mother     Cancer Mother     Alzheimer's disease Mother     Diabetes Brother     Alcohol abuse Father     Diabetes Maternal Grandmother     Diabetes Other     Heart disease Other        Meds/Allergies       (Not in a hospital admission)  No current facility-administered medications for this visit  No Known Allergies    Objective     Blood pressure 118/62, pulse 73, temperature 97 8 °F (36 6 °C), resp  rate 18, height 5' 4" (1 626 m), weight 83 1 kg (183 lb 3 2 oz)      [unfilled]    PHYSICAL EXAM     GEN: well nourished, well developed, no acute distress  HEENT: anicteric, MMM, no cervical or supraclavicular lymphadenopathy  CV: RRR, no m/r/g  CHEST: CTA b/l, no WRR  ABD: +BS, soft, NT/ND, no hepatosplenomegaly  EXT: no c/c/e  SKIN: no rashes,  NEURO: aaox3    Lab Results:   No visits with results within 1 Day(s) from this visit  Latest known visit with results is:   Lab Requisition on 03/03/2017   Component Date Value    Case Report 03/03/2017                      Value:Gynecologic Cytology Report                       Case: [id number]                                  Authorizing Provider:  Rah Kinsey MD   Collected:           03/03/2017 1000              First Screen:          KULWINDER Agustin     Received:            03/06/2017 4487              Specimen:    LIQUID-BASED PAP, SCREENING, Endocervical                                                  High Risk HPV Result 03/03/2017                      Value:HPV, High Risk: HPV NEG, HPV16 NEG, HPV18 NEG      Other High Risk HPV Negative, HPV 16 Negative, HPV 18 Negative  HPV types: 16,18,31,33,35,39,45,51,52,56,58,59,66 and 68 DNA are undetectable or below the pre-set threshold  Roches FDA approved Georgi 4800 is utilized with strict adherence to the s instruction  manual to test for the presence of High-Risk HPV DNA, as well as HPV 16 and HPV 18  This instrument  has been validated by our laboratory and/or by the   A negative result does not preclude the presence of HPV infection because results depend on adequate  specimen collection, absence of inhibitors and sufficient DNA to be detected  Additionally, HPV negative  results are not intended to prevent women from proceeding to colposcopy if clinically warranted  Positive HPV test results indicate the presence of any one or more of the high risk types, but since patients  are often co-infected with low-risk types it does not rule out the presence of low-risk                           types in patients  with mixed infections      Primary Interpretation 03/03/2017 Negative for intraepithelial lesion or malignancy     Specimen Adequacy 03/03/2017 Satisfactory for evaluation  Endocervical/transformation zone component present   Additional Information 03/03/2017                      Value: This result contains rich text formatting which cannot be displayed here   HPV, High Risk 03/03/2017 HPV NEG, HPV16 NEG, HPV18 NEG      Imaging Studies: I have personally reviewed pertinent films in PACS              Answers for HPI/ROS submitted by the patient on 9/24/2018   Abdominal pain  anorexia: No  arthralgias: No  belching: No  constipation: No  diarrhea: No  dysuria: No  fever: No  flatus: No  frequency: No  headaches: No  hematochezia: No  hematuria: No  melena: No  myalgias: No  nausea:  No  weight loss: No  vomiting: No

## 2018-10-01 ENCOUNTER — TRANSCRIBE ORDERS (OUTPATIENT)
Dept: RADIOLOGY | Facility: HOSPITAL | Age: 63
End: 2018-10-01

## 2018-10-01 ENCOUNTER — HOSPITAL ENCOUNTER (OUTPATIENT)
Dept: RADIOLOGY | Facility: HOSPITAL | Age: 63
Discharge: HOME | End: 2018-10-01
Admitting: RADIOLOGY
Payer: COMMERCIAL

## 2018-10-01 DIAGNOSIS — E04.1 RIGHT THYROID NODULE: ICD-10-CM

## 2018-10-01 PROCEDURE — 10022 HB FNA W/IMAGE: CPT

## 2018-10-01 PROCEDURE — 88173 CYTOPATH EVAL FNA REPORT: CPT | Performed by: PATHOLOGY

## 2018-10-01 PROCEDURE — 88172 CYTP DX EVAL FNA 1ST EA SITE: CPT | Performed by: PATHOLOGY

## 2018-10-01 PROCEDURE — 88184 FLOWCYTOMETRY/ TC 1 MARKER: CPT | Performed by: FAMILY MEDICINE

## 2018-10-01 PROCEDURE — 88188 FLOWCYTOMETRY/READ 9-15: CPT | Performed by: PATHOLOGY

## 2018-10-01 PROCEDURE — 76942 ECHO GUIDE FOR BIOPSY: CPT

## 2018-10-01 PROCEDURE — 88185 FLOWCYTOMETRY/TC ADD-ON: CPT

## 2018-10-01 RX ORDER — LIDOCAINE HYDROCHLORIDE 10 MG/ML
3 INJECTION, SOLUTION INFILTRATION; PERINEURAL ONCE
Status: COMPLETED | OUTPATIENT
Start: 2018-10-01 | End: 2018-10-01

## 2018-10-01 RX ADMIN — LIDOCAINE HYDROCHLORIDE 3 ML: 10 INJECTION, SOLUTION INFILTRATION; PERINEURAL at 10:45

## 2018-10-02 ENCOUNTER — TELEPHONE (OUTPATIENT)
Dept: GASTROENTEROLOGY | Facility: CLINIC | Age: 63
End: 2018-10-02

## 2018-10-02 NOTE — TELEPHONE ENCOUNTER
Dr Rio Mederos pt    Please return the pharmacy to clarify the script sent on 9/25 by Dr Jacob Bustamante, it is unclear what medication this script is fore   [phone] reference # [id number]

## 2018-10-04 LAB — SCAN RESULT: NORMAL

## 2018-10-05 DIAGNOSIS — E04.1 RIGHT THYROID NODULE: Primary | ICD-10-CM

## 2018-10-15 ENCOUNTER — OFFICE VISIT (OUTPATIENT)
Dept: HEMATOLOGY ONCOLOGY | Facility: CLINIC | Age: 63
End: 2018-10-15
Payer: COMMERCIAL

## 2018-10-15 VITALS
RESPIRATION RATE: 18 BRPM | DIASTOLIC BLOOD PRESSURE: 70 MMHG | TEMPERATURE: 98.7 F | SYSTOLIC BLOOD PRESSURE: 130 MMHG | HEIGHT: 64 IN | OXYGEN SATURATION: 97 % | BODY MASS INDEX: 31.58 KG/M2 | HEART RATE: 73 BPM | WEIGHT: 185 LBS

## 2018-10-15 DIAGNOSIS — E04.1 THYROID NODULE: ICD-10-CM

## 2018-10-15 DIAGNOSIS — E04.1 RIGHT THYROID NODULE: Primary | ICD-10-CM

## 2018-10-15 PROCEDURE — 99245 OFF/OP CONSLTJ NEW/EST HI 55: CPT | Performed by: INTERNAL MEDICINE

## 2018-10-15 NOTE — LETTER
October 15, 2018     Alena Downey, [address]    Patient: Dax Moon   YOB: 1955   Date of Visit: 10/15/2018       Dear Dr Louis Free: Thank you for referring Dax Moon to me for evaluation  Below are my notes for this consultation  If you have questions, please do not hesitate to call me  I look forward to following your patient along with you  Sincerely,        Cindy Baird MD        CC: No Recipients  Cindy Baird MD  10/15/2018  5:07 PM  Sign at close encounter  Oncology Consult Note  Dax Moon 66 y o  female MRN: [id number]  Unit/Bed#:  Encounter: [id number]      Presenting Complaint: abnormal finding of the fine-needle aspiration of the right thyroid lobe nodules    History of Presenting Illness:   59-year-old  female with with presumptive history of MS, with right-sided weakness, she had an MRI of the cervical area accidentally showed multiple nodules on the thyroid gland, subsequently the patient had an ultrasound of the thyroid in September 2018 showed thyroid parenchyma is diffusely heterogeneous and hyperemic with focal nodules in the right side the 1st 1 in the right upper lobe measuring 0 9 x 0 7 x 1 2 cm, the 2nd nodule measuring 1 6 x 0 9 x 1 2 cm and the 3rd nodule measuring 1 4 x 0 5 by 0 7 cm  She underwent fine-needle aspiration of 1 of the thyroid nodule, could not rule out follicular hyperplasia versus low-grade lymphoma, flow cytometry showed positive CD 10 lymphocytes  She denies any fever chills nausea vomiting diarrhea constipation dysuria hematuria melena hematochezia subjective lymphadenopathy, skin rash, weight loss    Review of Systems - As stated in the HPI otherwise the fourteen point review of systems was negative      Past Medical History:   Diagnosis Date    Abnormal Pap smear of cervix 1990    Endometriosis     GERD (gastroesophageal reflux disease)     Phlebitis     Thyroid nodule 9/11/2018    Varicose vein        Social History     Social History    Marital status: /Civil Union     Spouse name: N/A    Number of children: N/A    Years of education: N/A     Social History Main Topics    Smoking status: Former Smoker    Smokeless tobacco: Never Used      Comment: denied history of current every day smoker, denied history of current smoker per allscripts    Alcohol use No      Comment: social per allscripts    Drug use: No    Sexual activity: Yes     Partners: Male     Other Topics Concern    Not on file     Social History Narrative    Caffeine use    Exercising regularly               Family History   Problem Relation Age of Onset    Diabetes Mother     Dementia Mother     Cancer Mother     Alzheimer's disease Mother     Diabetes Brother     Alcohol abuse Father     Diabetes Maternal Grandmother     Diabetes Other     Heart disease Other        No Known Allergies      Current Outpatient Prescriptions:     calcium acetate (PHOSLO) 667 mg capsule, Take 1,334 mg by mouth 3 (three) times a day with meals, Disp: , Rfl:     cholecalciferol (VITAMIN D3) 1,000 units tablet, Take 1,000 Units by mouth daily, Disp: , Rfl:     famciclovir (FAMVIR) 500 mg tablet, , Disp: , Rfl:     Na Sulfate-K Sulfate-Mg Sulf 17 5-3 13-1 6 GM/180ML SOLN, Take 1 applicator by mouth once for 1 dose, Disp: 1 Bottle, Rfl: 0      /70   Pulse 73   Temp 98 7 °F (37 1 °C) (Tympanic)   Resp 18   Ht 5' 4" (1 626 m)   Wt 83 9 kg (185 lb)   SpO2 97%   BMI 31 76 kg/m²        General Appearance:    Alert, oriented        Eyes:    PERRL   Ears:    Normal external ear canals, both ears   Nose:   Nares normal, septum midline   Throat:   Mucosa moist  Pharynx without injection, slightly enlarged right thyroid gland      Neck:   Supple       Lungs:     Clear to auscultation bilaterally   Chest Wall:    No tenderness or deformity    Heart:    Regular rate and rhythm       Abdomen: Soft, non-tender, bowel sounds +, no organomegaly           Extremities:   Extremities no cyanosis or edema       Skin:   no rash or icterus  Lymph nodes:   Cervical, supraclavicular, and axillary nodes normal   Neurologic:   CNII-XII intact, normal strength, sensation and reflexes     Throughout               No results found for this or any previous visit (from the past 48 hour(s))  Us Thyroid    Result Date: 9/19/2018  Narrative: THYROID ULTRASOUND INDICATION:    E04 1: Nontoxic single thyroid nodule  Thyroid nodule detected on recent outside cross-sectional study  COMPARISON:  None TECHNIQUE:   Ultrasound of the thyroid was performed with a high frequency linear transducer in transverse and sagittal planes including volumetric imaging sweeps as well as traditional still imaging technique  FINDINGS:  Thyroid parenchyma is diffusely heterogeneous and hyperemic in echotexture with focal nodule(s) as described below  Right lobe:  5 5 x 1 6 x 2 0 cm  Left lobe:  5 1 x 1 5 x 1 7 cm  Isthmus:  0 2 cm  Nodule #1 RIGHT upper pole nodule measuring 0 9 x 0 7 x 1 2 cm  No priors for comparison  COMPOSITION:  0 points, cystic or almost completely cystic  ECHOGENICITY:  0 points, anechoic  SHAPE:  0 points, wider-than-tall  MARGIN: 0 points, smooth  ECHOGENIC FOCI:  0 points, none or large comet-tail artifacts  TI-RADS Classification: TR 1 (0 points), Benign  No FNA  Nodule #2 RIGHT midgland nodule measuring 1 6 x 0 9 x 1 2 cm  No priors for comparison  COMPOSITION:  2 points, solid or almost completely solid   ECHOGENICITY:  2 points, hypoechoic  SHAPE:  0 points, wider-than-tall  MARGIN: 0 points, ill-defined  ECHOGENIC FOCI:  0 points, none or large comet-tail artifacts  TI-RADS Classification: TR 4 (4-6 points), Moderately suspicious  FNA if > 1 5 cm  Follow if > 1cm  Nodule #3 RIGHT lower pole nodule measuring 1 4 x 0 5 x 0 7 cm  No priors for comparison   COMPOSITION:  0 points, cystic or almost completely cystic  ECHOGENICITY:  0 points, anechoic  SHAPE:  0 points, wider-than-tall  MARGIN: 0 points, smooth  ECHOGENIC FOCI:  0 points, none or large comet-tail artifacts  TI-RADS Score: TR 1 (0 points), Benign  No FNA  Impression: Multinodular diffusely heterogeneous and hyperemic thyroid gland  The following nodule meets current ACR criteria for recommending ultrasound guided biopsy: The 1 6 x 0 9 x 1 2 cm right midpole nodule  (Image number 10, 11) (CRITERIA: TR 4, Moderately suspicious  FNA if > 1 5 cm  Reference: ACR Thyroid Imaging, Reporting and Data System (TI-RADS): White Paper of the [employer]  J AM Ru Radiol 4313;42:380-636  (additional recommendations based on American Thyroid Association 2015 guidelines ) SCRIBE ATTESTATION I, Adela Vences PA-C am acting as a scribe while in the presence of the attending physician  DELMAR Mclaughlin  personally reviewed and interpreted the study in this report as scribed in my presence  Workstation performed: [id number]     Us Guided Thyroid Biopsy    Result Date: 10/1/2018  Narrative: ULTRASOUND-GUIDED THYROID BIOPSY HISTORY: 66year-old with a history of thyroid nodules  Patient presents with a prescription for ultrasound-guided fine-needle aspiration biopsy of the right thyroid nodule  On previous ultrasound a right midpole thyroid nodule was identified which met criteria for fine-needle aspiration  COMPARISON: 9/18/2018 US Thyroid FINDINGS: Prior ultrasound images were reviewed  On ultrasound imaging performed today, the corresponding right midpole thyroid nodule measures 1 6 x 0 9 x 1 2 cm and will be targeted for biopsy today  The procedure was explained to the patient, including risks of hemorrhage, infection and local injury  The possibility of a nondiagnostic biopsy result and the need for repeat biopsy or sonographic follow up was explained to the patient  Informed consent was freely obtained   The patient verbalized expressed understanding of the above risks and wished to proceed with the procedure  Final standard "time-out" procedure performed  PROCEDURE: The neck was prepped and draped in normal sterile fashion  Under real-time ultrasound guidance and local anesthesia three passes with 25 gauge needles were made through the right midpole thyroid nodule  Cytopathology was present and deemed the specimens inadequate for initial evaluation  Therefore, an additional, two passes with 25 gauge needles were made through the right midpole thyroid nodule and presented to cytopathology for further evaluation  The patient tolerated the procedure well  Postprocedure instructions were provided for the patient  I asked the patient to call us with any questions, concerns, or acute problems  The patient expressed understanding of the above  Impression: Status post ultrasound-guided thyroid biopsy  The above findings and procedure were reviewed with Dr Elias Hill  Procedure was performed by Rxoane Vences PA-C under the direct supervision of Dr Yan Bliss   Workstation performed: [id number]       Assessment and plan:   Abnormal ultrasound of the thyroid gland with heterogeneous diffuse appearance of the thyroid parenchyma, 3 right lobe nodules bigger than 1 cm H in diameter, fine-needle aspiration in September 2018 was suspicious with [place] category 3 for possible lymphoma involvement, flow cytometry could not differentiate the cord it possible follicular hyperplasia however few cells are positive for CD10   I had long discussion with the patient who possible have multiple sclerosis and autoimmune disorders with positive ZURDO, she might have Hashimoto thyroiditis, I will order TSH, T4, the patient needs to be seen by surgical oncology for consideration of right hemithyroidectomy for definitive diagnosis  No need for additional workup such as PET scan at this time without having definitive diagnosis  45 minutes for the interview with the patient and her

## 2018-10-15 NOTE — PROGRESS NOTES
Oncology Consult Note  Callie Sewell 66 y o  female MRN: [id number]  Unit/Bed#:  Encounter: [id number]      Presenting Complaint: abnormal finding of the fine-needle aspiration of the right thyroid lobe nodules    History of Presenting Illness:   61-year-old  female with with presumptive history of MS, with right-sided weakness, she had an MRI of the cervical area accidentally showed multiple nodules on the thyroid gland, subsequently the patient had an ultrasound of the thyroid in September 2018 showed thyroid parenchyma is diffusely heterogeneous and hyperemic with focal nodules in the right side the 1st 1 in the right upper lobe measuring 0 9 x 0 7 x 1 2 cm, the 2nd nodule measuring 1 6 x 0 9 x 1 2 cm and the 3rd nodule measuring 1 4 x 0 5 by 0 7 cm  She underwent fine-needle aspiration of 1 of the thyroid nodule, could not rule out follicular hyperplasia versus low-grade lymphoma, flow cytometry showed positive CD 10 lymphocytes  She denies any fever chills nausea vomiting diarrhea constipation dysuria hematuria melena hematochezia subjective lymphadenopathy, skin rash, weight loss    Review of Systems - As stated in the HPI otherwise the fourteen point review of systems was negative      Past Medical History:   Diagnosis Date    Abnormal Pap smear of cervix 1990    Endometriosis     GERD (gastroesophageal reflux disease)     Phlebitis     Thyroid nodule 9/11/2018    Varicose vein        Social History     Social History    Marital status: /Civil Union     Spouse name: N/A    Number of children: N/A    Years of education: N/A     Social History Main Topics    Smoking status: Former Smoker    Smokeless tobacco: Never Used      Comment: denied history of current every day smoker, denied history of current smoker per allscripts    Alcohol use No      Comment: social per allscripts    Drug use: No    Sexual activity: Yes     Partners: Male     Other Topics Concern    Not on file Social History Narrative    Caffeine use    Exercising regularly               Family History   Problem Relation Age of Onset    Diabetes Mother     Dementia Mother     Cancer Mother     Alzheimer's disease Mother     Diabetes Brother     Alcohol abuse Father     Diabetes Maternal Grandmother     Diabetes Other     Heart disease Other        No Known Allergies      Current Outpatient Prescriptions:     calcium acetate (PHOSLO) 667 mg capsule, Take 1,334 mg by mouth 3 (three) times a day with meals, Disp: , Rfl:     cholecalciferol (VITAMIN D3) 1,000 units tablet, Take 1,000 Units by mouth daily, Disp: , Rfl:     famciclovir (FAMVIR) 500 mg tablet, , Disp: , Rfl:     Na Sulfate-K Sulfate-Mg Sulf 17 5-3 13-1 6 GM/180ML SOLN, Take 1 applicator by mouth once for 1 dose, Disp: 1 Bottle, Rfl: 0      /70   Pulse 73   Temp 98 7 °F (37 1 °C) (Tympanic)   Resp 18   Ht 5' 4" (1 626 m)   Wt 83 9 kg (185 lb)   SpO2 97%   BMI 31 76 kg/m²       General Appearance:    Alert, oriented        Eyes:    PERRL   Ears:    Normal external ear canals, both ears   Nose:   Nares normal, septum midline   Throat:   Mucosa moist  Pharynx without injection, slightly enlarged right thyroid gland  Neck:   Supple       Lungs:     Clear to auscultation bilaterally   Chest Wall:    No tenderness or deformity    Heart:    Regular rate and rhythm       Abdomen:     Soft, non-tender, bowel sounds +, no organomegaly           Extremities:   Extremities no cyanosis or edema       Skin:   no rash or icterus  Lymph nodes:   Cervical, supraclavicular, and axillary nodes normal   Neurologic:   CNII-XII intact, normal strength, sensation and reflexes     Throughout               No results found for this or any previous visit (from the past 48 hour(s))  Us Thyroid    Result Date: 9/19/2018  Narrative: THYROID ULTRASOUND INDICATION:    E04 1: Nontoxic single thyroid nodule      Thyroid nodule detected on recent outside cross-sectional study  COMPARISON:  None TECHNIQUE:   Ultrasound of the thyroid was performed with a high frequency linear transducer in transverse and sagittal planes including volumetric imaging sweeps as well as traditional still imaging technique  FINDINGS:  Thyroid parenchyma is diffusely heterogeneous and hyperemic in echotexture with focal nodule(s) as described below  Right lobe:  5 5 x 1 6 x 2 0 cm  Left lobe:  5 1 x 1 5 x 1 7 cm  Isthmus:  0 2 cm  Nodule #1 RIGHT upper pole nodule measuring 0 9 x 0 7 x 1 2 cm  No priors for comparison  COMPOSITION:  0 points, cystic or almost completely cystic  ECHOGENICITY:  0 points, anechoic  SHAPE:  0 points, wider-than-tall  MARGIN: 0 points, smooth  ECHOGENIC FOCI:  0 points, none or large comet-tail artifacts  TI-RADS Classification: TR 1 (0 points), Benign  No FNA  Nodule #2 RIGHT midgland nodule measuring 1 6 x 0 9 x 1 2 cm  No priors for comparison  COMPOSITION:  2 points, solid or almost completely solid   ECHOGENICITY:  2 points, hypoechoic  SHAPE:  0 points, wider-than-tall  MARGIN: 0 points, ill-defined  ECHOGENIC FOCI:  0 points, none or large comet-tail artifacts  TI-RADS Classification: TR 4 (4-6 points), Moderately suspicious  FNA if > 1 5 cm  Follow if > 1cm  Nodule #3 RIGHT lower pole nodule measuring 1 4 x 0 5 x 0 7 cm  No priors for comparison  COMPOSITION:  0 points, cystic or almost completely cystic  ECHOGENICITY:  0 points, anechoic  SHAPE:  0 points, wider-than-tall  MARGIN: 0 points, smooth  ECHOGENIC FOCI:  0 points, none or large comet-tail artifacts  TI-RADS Score: TR 1 (0 points), Benign  No FNA  Impression: Multinodular diffusely heterogeneous and hyperemic thyroid gland  The following nodule meets current ACR criteria for recommending ultrasound guided biopsy: The 1 6 x 0 9 x 1 2 cm right midpole nodule  (Image number 10, 11) (CRITERIA: TR 4, Moderately suspicious  FNA if > 1 5 cm   Reference: ACR Thyroid Imaging, Reporting and Data System (TI-RADS): White Paper of the [employer]  J AM Ru Radiol 8905;09:598-875  (additional recommendations based on American Thyroid Association 2015 guidelines ) SCRIBE ATTESTATION I, Adela Vences PA-C am acting as a scribe while in the presence of the attending physician  DELMAR Richey  personally reviewed and interpreted the study in this report as scribed in my presence  Workstation performed: [id number]     Us Guided Thyroid Biopsy    Result Date: 10/1/2018  Narrative: ULTRASOUND-GUIDED THYROID BIOPSY HISTORY: 66year-old with a history of thyroid nodules  Patient presents with a prescription for ultrasound-guided fine-needle aspiration biopsy of the right thyroid nodule  On previous ultrasound a right midpole thyroid nodule was identified which met criteria for fine-needle aspiration  COMPARISON: 9/18/2018 US Thyroid FINDINGS: Prior ultrasound images were reviewed  On ultrasound imaging performed today, the corresponding right midpole thyroid nodule measures 1 6 x 0 9 x 1 2 cm and will be targeted for biopsy today  The procedure was explained to the patient, including risks of hemorrhage, infection and local injury  The possibility of a nondiagnostic biopsy result and the need for repeat biopsy or sonographic follow up was explained to the patient  Informed consent was freely obtained  The patient verbalized expressed understanding of the above risks and wished to proceed with the procedure  Final standard "time-out" procedure performed  PROCEDURE: The neck was prepped and draped in normal sterile fashion  Under real-time ultrasound guidance and local anesthesia three passes with 25 gauge needles were made through the right midpole thyroid nodule  Cytopathology was present and deemed the specimens inadequate for initial evaluation    Therefore, an additional, two passes with 25 gauge needles were made through the right midpole thyroid nodule and presented to cytopathology for further evaluation  The patient tolerated the procedure well  Postprocedure instructions were provided for the patient  I asked the patient to call us with any questions, concerns, or acute problems  The patient expressed understanding of the above  Impression: Status post ultrasound-guided thyroid biopsy  The above findings and procedure were reviewed with Dr Bee Bell  Procedure was performed by Prem Vences PA-C under the direct supervision of Dr Roel Ty   Workstation performed: [id number]       Assessment and plan:   Abnormal ultrasound of the thyroid gland with heterogeneous diffuse appearance of the thyroid parenchyma, 3 right lobe nodules bigger than 1 cm H in diameter, fine-needle aspiration in September 2018 was suspicious with Kamuela category 3 for possible lymphoma involvement, flow cytometry could not differentiate the cord it possible follicular hyperplasia however few cells are positive for CD10   I had long discussion with the patient who possible have multiple sclerosis and autoimmune disorders with positive ZURDO, she might have Hashimoto thyroiditis, I will order TSH, T4, the patient needs to be seen by surgical oncology for consideration of right hemithyroidectomy for definitive diagnosis  No need for additional workup such as PET scan at this time without having definitive diagnosis  45 minutes for the interview with the patient and her

## 2018-10-16 ENCOUNTER — OFFICE VISIT (OUTPATIENT)
Dept: SURGICAL ONCOLOGY | Facility: CLINIC | Age: 63
End: 2018-10-16
Payer: COMMERCIAL

## 2018-10-16 VITALS
TEMPERATURE: 97.6 F | SYSTOLIC BLOOD PRESSURE: 116 MMHG | WEIGHT: 183 LBS | HEART RATE: 76 BPM | HEIGHT: 64 IN | DIASTOLIC BLOOD PRESSURE: 78 MMHG | RESPIRATION RATE: 16 BRPM | BODY MASS INDEX: 31.24 KG/M2

## 2018-10-16 DIAGNOSIS — E04.1 THYROID NODULE: Primary | ICD-10-CM

## 2018-10-16 PROCEDURE — 99245 OFF/OP CONSLTJ NEW/EST HI 55: CPT | Performed by: SURGERY

## 2018-10-16 NOTE — PROGRESS NOTES
Surgical Oncology Consult       [address],[address] [facility]  [facility] [place]  [address] [place] [address] [address] [place] [address]  1955  [id number]  [address],[address] [place] [address]  [facility] [place]  [address] [place] [id number]    Diagnoses and all orders for this visit:    Thyroid nodule  -     US guided thyroid biopsy; Future        Chief Complaint   Patient presents with    Thyroid Problem     Pt here for consultation following atypical thyroid biopsy  Return in about 4 months (around 2/16/2019)  No history exists  History of Present Illness:   30-year-old female who has been recently diagnosed with MS  She had an MRI that showed an incidental thyroid nodule  Ultrasound of the thyroid on September 18, 2018 revealed 3 thyroid nodules on the right  The largest was 1 6 x 0 9 x 1 2 cm  This was moderately suspicious  The 2 other nodules were benign  I personally reviewed her films  Ultrasound-guided thyroid biopsy on October 1, 2018 revealed atypia of undetermined seconds  A there were many lymphocyte seen and a few were atypical, but flow cytometry revealed a small population of CD 19/CT tend be cells  There is no evidence of T-cell lymphoproliferative disorder  She comes in now to discuss further therapy  She denies any dysphagia or hoarseness  No family history of thyroid cancer  No fevers, chills, night sweats, no recent fatigue or unintentional weight loss  Review of Systems  Complete ROS Surg Onc:   Constitutional: The patient denies new or recent history of general fatigue, no recent weight loss, no change in appetite  Eyes: No complaints of visual problems, no scleral icterus  ENT: no complaints of ear pain, no hoarseness, no difficulty swallowing,  no tinnitus and no new masses in head, oral cavity, or neck  Cardiovascular: No complaints of chest pain, no palpitations, no ankle edema  Respiratory: No complaints of shortness of breath, no cough  Gastrointestinal: No complaints of jaundice, no bloody stools, no pale stools  Genitourinary: No complaints of dysuria, no hematuria, no nocturia, no frequent urination, no urethral discharge  Musculoskeletal: No complaints of weakness, paralysis, joint stiffness or arthralgias  Integumentary: No complaints of rash, no new lesions  Neurological: No complaints of convulsions, no seizures, no dizziness  Hematologic/Lymphatic: No complaints of easy bruising  Endocrine:  No hot or cold intolerance  No polydipsia, polyphagia, or polyuria  Allergy/immunology:  No environmental allergies  No food allergies  Not immunocompromised  Skin:  No pallor or rash  No wound            Patient Active Problem List   Diagnosis    Varicose veins of both legs with edema    Atrophic vaginitis    GERD (gastroesophageal reflux disease)    Lymphedema    Osteoarthritis    Right leg weakness    Thyroid nodule    Screen for colon cancer    Gastroesophageal reflux disease without esophagitis     Past Medical History:   Diagnosis Date    Abnormal Pap smear of cervix 1990    Endometriosis     GERD (gastroesophageal reflux disease)     Phlebitis     Thyroid nodule 9/11/2018    Varicose vein      Past Surgical History:   Procedure Laterality Date    CERVIX SURGERY      ENDOVENOUS ABLATION SAPHENOUS VEIN W/ LASER      of incompetent vein laser last assessed 10/19/2015    GYNECOLOGIC CRYOSURGERY      early 's cervix per allscripts    OOPHORECTOMY      OOPHORECTOMY Right     NV PHLEB VEINS - EXTREM - TO 20 Right 3/22/2016    Procedure: Right leg multiple stab phlebectomies ;  Surgeon: Christiana Donovan MD;  [facility]: [facility];  Service: Vascular    STERILIZATION      bilateral partial salpingectomy    TONSILLECTOMY AND ADENOIDECTOMY      age 13 per allscripts    US GUIDED THYROID BIOPSY  10/1/2018    VARICOSE VEIN SURGERY       Family History   Problem Relation Age of Onset    Diabetes Mother     Dementia Mother     Cancer Mother 80    Alzheimer's disease Mother     Diabetes Brother     Alcohol abuse Father     Diabetes Maternal Grandmother     Diabetes Other     Heart disease Other      Social History     Social History    Marital status: /Civil Union     Spouse name: N/A    Number of children: N/A    Years of education: N/A     Occupational History    Not on file  Social History Main Topics    Smoking status: Former Smoker    Smokeless tobacco: Never Used      Comment: denied history of current every day smoker, denied history of current smoker per allscripts    Alcohol use No      Comment: social per allscripts    Drug use: No    Sexual activity: Yes     Partners: Male     Other Topics Concern    Not on file     Social History Narrative    Caffeine use    Exercising regularly               Current Outpatient Prescriptions:     calcium acetate (PHOSLO) 667 mg capsule, Take 1,334 mg by mouth 3 (three) times a day with meals, Disp: , Rfl:     cholecalciferol (VITAMIN D3) 1,000 units tablet, Take 1,000 Units by mouth daily, Disp: , Rfl:     famciclovir (FAMVIR) 500 mg tablet, , Disp: , Rfl:     Na Sulfate-K Sulfate-Mg Sulf 17 5-3 13-1 6 GM/180ML SOLN, Take 1 applicator by mouth once for 1 dose, Disp: 1 Bottle, Rfl: 0  No Known Allergies  Vitals:    10/16/18 0851   BP: 116/78   Pulse: 76   Resp: 16   Temp: 97 6 °F (36 4 °C)       Physical Exam   Constitutional: General appearance: The Patient is well-developed and well-nourished who appears the stated age in no acute distress  Patient is pleasant and talkative  HEENT:  Normocephalic  Sclerae are anicteric  Mucous membranes are moist  Neck is supple without adenopathy  No JVD  there is a palpable right thyroid nodule  Chest: The lungs are clear to auscultation  Cardiac: Heart is regular rate  Abdomen: Abdomen is soft, non-tender, non-distended and without masses  Extremities: There is no clubbing or cyanosis  There is no edema  Symmetric  Neuro: Grossly nonfocal  Gait is normal      Lymphatic: No evidence of cervical adenopathy bilaterally  No evidence of axillary adenopathy bilaterally  No evidence of inguinal adenopathy bilaterally  Skin: Warm, anicteric  Psych:  Patient is pleasant and talkative  Breasts:      Pathology:  Final Diagnosis   A  B  Thyroid, Right, mid pole ( ThinPrep and smear preparations ):  Atypia of undetermined significance ([place] Category III) - See note  Hypercellular specimen  Many lymphocytes seen, few are atypical   Few follicular cells seen  Few Hurthle cells seen  Few histiocytes,  Can not rule out lymphoproliferative disorders     Satisfactory for evaluation     Note:  (1) As reported in the [facility] for Reporting Thyroid Cytopathology*, this diagnostic category has demonstrated anywhere from 10-30% risk of malignancy being found in subsequent resections and/or FNA  This risk of malignancy is expected to change due to the usage of the surgical pathology diagnosis of non-invasive follicular thyroid neoplasm with papillary-like nuclear features (NIFTP)    The anticipated risk of malignancy secondary to NIFTP is 6-18%  The manual reports that the usual management following this diagnosis is repeat FNA, molecular testing, or lobectomy  Ultimately, clinical/imaging correlation for this patient is needed in arriving at the actual management plan      *The [place] System for Reporting Thyroid Cytopathology, Nick Standard , Abhay Akhtar ), 2018 (2nd ed )     - Flow cytometry ([facility]# [id number], evaluated by Dr Lavern Ramirez MD) reveals a small population of CD19/Cd10+ B cells with kappa excess (~2%) in a polytypic background   No evidence of T cell lymphoproliferative disorders   * Pertinent findings in Interpretation   * Viability 7AAD: 91%     * The following antigens were evaluated & found to be expressed as described above or by appropriate cells:  CD2, CD3, CD4, CD5, CD7, CD8, CD10, CD11c, CD19, CD20, CD23, CD38, CD45, CD56, CD57, sKappa, sLambda      Appropriate clinical follow up is recommended  Electronically signed by Mara Morillo MD on 10/2/2018 at  3:59 PM   Intraoperative Consultation   Thyroid, right mid pole,  FNAB on-site evaluation: Many lymphocytes and histiocytes, few follicular cells  Sent for flow cytometry      Dr Tessie Maldonado            Labs:      Imaging  Us Thyroid    Result Date: 9/19/2018  Narrative: THYROID ULTRASOUND INDICATION:    E04 1: Nontoxic single thyroid nodule  Thyroid nodule detected on recent outside cross-sectional study  COMPARISON:  None TECHNIQUE:   Ultrasound of the thyroid was performed with a high frequency linear transducer in transverse and sagittal planes including volumetric imaging sweeps as well as traditional still imaging technique  FINDINGS:  Thyroid parenchyma is diffusely heterogeneous and hyperemic in echotexture with focal nodule(s) as described below  Right lobe:  5 5 x 1 6 x 2 0 cm  Left lobe:  5 1 x 1 5 x 1 7 cm  Isthmus:  0 2 cm  Nodule #1 RIGHT upper pole nodule measuring 0 9 x 0 7 x 1 2 cm  No priors for comparison  COMPOSITION:  0 points, cystic or almost completely cystic  ECHOGENICITY:  0 points, anechoic  SHAPE:  0 points, wider-than-tall  MARGIN: 0 points, smooth  ECHOGENIC FOCI:  0 points, none or large comet-tail artifacts  TI-RADS Classification: TR 1 (0 points), Benign  No FNA  Nodule #2 RIGHT midgland nodule measuring 1 6 x 0 9 x 1 2 cm  No priors for comparison  COMPOSITION:  2 points, solid or almost completely solid   ECHOGENICITY:  2 points, hypoechoic  SHAPE:  0 points, wider-than-tall  MARGIN: 0 points, ill-defined  ECHOGENIC FOCI:  0 points, none or large comet-tail artifacts  TI-RADS Classification: TR 4 (4-6 points), Moderately suspicious  FNA if > 1 5 cm  Follow if > 1cm   Nodule #3 RIGHT lower pole nodule measuring 1 4 x 0 5 x 0 7 cm   No priors for comparison  COMPOSITION:  0 points, cystic or almost completely cystic  ECHOGENICITY:  0 points, anechoic  SHAPE:  0 points, wider-than-tall  MARGIN: 0 points, smooth  ECHOGENIC FOCI:  0 points, none or large comet-tail artifacts  TI-RADS Score: TR 1 (0 points), Benign  No FNA  Impression: Multinodular diffusely heterogeneous and hyperemic thyroid gland  The following nodule meets current ACR criteria for recommending ultrasound guided biopsy: The 1 6 x 0 9 x 1 2 cm right midpole nodule  (Image number 10, 11) (CRITERIA: TR 4, Moderately suspicious  FNA if > 1 5 cm  Reference: ACR Thyroid Imaging, Reporting and Data System (TI-RADS): White Paper of the [employer]  J AM Ru Radiol 7063;64:936-149  (additional recommendations based on American Thyroid Association 2015 guidelines ) SCRIBE ATTESTATION I, Adela Vences PA-C am acting as a scribe while in the presence of the attending physician  DELMAR Prasad  personally reviewed and interpreted the study in this report as scribed in my presence  Workstation performed: [id number]     Us Guided Thyroid Biopsy    Result Date: 10/1/2018  Narrative: ULTRASOUND-GUIDED THYROID BIOPSY HISTORY: 66year-old with a history of thyroid nodules  Patient presents with a prescription for ultrasound-guided fine-needle aspiration biopsy of the right thyroid nodule  On previous ultrasound a right midpole thyroid nodule was identified which met criteria for fine-needle aspiration  COMPARISON: 9/18/2018 US Thyroid FINDINGS: Prior ultrasound images were reviewed  On ultrasound imaging performed today, the corresponding right midpole thyroid nodule measures 1 6 x 0 9 x 1 2 cm and will be targeted for biopsy today  The procedure was explained to the patient, including risks of hemorrhage, infection and local injury  The possibility of a nondiagnostic biopsy result and the need for repeat biopsy or sonographic follow up was explained to the patient  Informed consent was freely obtained  The patient verbalized expressed understanding of the above risks and wished to proceed with the procedure  Final standard "time-out" procedure performed  PROCEDURE: The neck was prepped and draped in normal sterile fashion  Under real-time ultrasound guidance and local anesthesia three passes with 25 gauge needles were made through the right midpole thyroid nodule  Cytopathology was present and deemed the specimens inadequate for initial evaluation  Therefore, an additional, two passes with 25 gauge needles were made through the right midpole thyroid nodule and presented to cytopathology for further evaluation  The patient tolerated the procedure well  Postprocedure instructions were provided for the patient  I asked the patient to call us with any questions, concerns, or acute problems  The patient expressed understanding of the above  Impression: Status post ultrasound-guided thyroid biopsy  The above findings and procedure were reviewed with Dr Suze Wood  Procedure was performed by Tip Vences PA-C under the direct supervision of Dr Mingo Nolen  Workstation performed: [id number]     I reviewed the above laboratory and imaging data  Discussion/Summary:   59-year-old female with a multinodular thyroid  There are 3 nodules on the right  Two were benign by ultrasound  One met the criteria for biopsy  Biopsy revealed atypia of undetermined significance  There was a question of an underlying lymphoma  The patient has no signs or symptoms of lymphoma  There are certainly no palpable nodules and no other evidence of lymphadenopathy  I discussed with her, that in my experience that with lymphoma of the thyroid there has generally been an enlarged mass or a rapidly enlarging mass  With atypia undetermined significance, risk of an underlying malignancy is 10-13%    We discussed surgical intervention to remove the right thyroid and this would then answer the question of lymphoma as well  I also discussed repeating the thyroid biopsy with Antolin in 3 months  Since I doubt that this is lymphoma and even if this was malignant, waiting 3 months for a repeat biopsy would not alter her long-term survival I would be very comfortable with repeating her thyroid biopsy in 3 months  We also discussed risks involved with surgical resection  After some discussion we elected on repeat thyroid biopsy in 3 months after her daughter delivers at the end of January  We will schedule this with Antolin  I will see her back once we have those results  She is agreeable to this  All of her questions were answered

## 2018-10-16 NOTE — LETTER
October 16, 2018     Quin Oppenheim, [address]    Patient: Jackie Soriano   YOB: 1955   Date of Visit: 10/16/2018       Dear Dr Tasha Wolf: Thank you for referring Jackie Soriano to me for evaluation  Below are my notes for this consultation  If you have questions, please do not hesitate to call me  I look forward to following your patient along with you  Sincerely,        Yuriy Quevedo MD        CC: MD Yuriy Herbert MD  10/16/2018  9:41 AM  Sign at close encounter               Surgical Oncology Consult       [address] [place] [address]  1955  [id number]  [address] [facility] [facility]  [facility] [phone] [id number]    Diagnoses and all orders for this visit:    Thyroid nodule  -     US guided thyroid biopsy; Future        Chief Complaint   Patient presents with    Thyroid Problem     Pt here for consultation following atypical thyroid biopsy  Return in about 4 months (around 2/16/2019)  No history exists  History of Present Illness:   28-year-old female who has been recently diagnosed with MS  She had an MRI that showed an incidental thyroid nodule  Ultrasound of the thyroid on September 18, 2018 revealed 3 thyroid nodules on the right  The largest was 1 6 x 0 9 x 1 2 cm  This was moderately suspicious  The 2 other nodules were benign  I personally reviewed her films  Ultrasound-guided thyroid biopsy on October 1, 2018 revealed atypia of undetermined seconds  A there were many lymphocyte seen and a few were atypical, but flow cytometry revealed a small population of CD 19/CT tend be cells  There is no evidence of T-cell lymphoproliferative disorder  She comes in now to discuss further therapy  She denies any dysphagia or hoarseness    No family history of thyroid cancer  No fevers, chills, night sweats, no recent fatigue or unintentional weight loss  Review of Systems  Complete ROS Surg Onc:   Constitutional: The patient denies new or recent history of general fatigue, no recent weight loss, no change in appetite  Eyes: No complaints of visual problems, no scleral icterus  ENT: no complaints of ear pain, no hoarseness, no difficulty swallowing,  no tinnitus and no new masses in head, oral cavity, or neck  Cardiovascular: No complaints of chest pain, no palpitations, no ankle edema  Respiratory: No complaints of shortness of breath, no cough  Gastrointestinal: No complaints of jaundice, no bloody stools, no pale stools  Genitourinary: No complaints of dysuria, no hematuria, no nocturia, no frequent urination, no urethral discharge  Musculoskeletal: No complaints of weakness, paralysis, joint stiffness or arthralgias  Integumentary: No complaints of rash, no new lesions  Neurological: No complaints of convulsions, no seizures, no dizziness  Hematologic/Lymphatic: No complaints of easy bruising  Endocrine:  No hot or cold intolerance  No polydipsia, polyphagia, or polyuria  Allergy/immunology:  No environmental allergies  No food allergies  Not immunocompromised  Skin:  No pallor or rash  No wound            Patient Active Problem List   Diagnosis    Varicose veins of both legs with edema    Atrophic vaginitis    GERD (gastroesophageal reflux disease)    Lymphedema    Osteoarthritis    Right leg weakness    Thyroid nodule    Screen for colon cancer    Gastroesophageal reflux disease without esophagitis     Past Medical History:   Diagnosis Date    Abnormal Pap smear of cervix 1990    Endometriosis     GERD (gastroesophageal reflux disease)     Phlebitis     Thyroid nodule 9/11/2018    Varicose vein      Past Surgical History:   Procedure Laterality Date    CERVIX SURGERY      ENDOVENOUS ABLATION SAPHENOUS VEIN W/ LASER      of incompetent vein laser last assessed 10/19/2015    GYNECOLOGIC CRYOSURGERY      early 's cervix per allscripts    OOPHORECTOMY      OOPHORECTOMY Right     ND PHLEB VEINS - EXTREM - TO 20 Right 3/22/2016    Procedure: Right leg multiple stab phlebectomies ;  Surgeon: Mayra So MD;  [facility];  Service: Vascular    STERILIZATION      bilateral partial salpingectomy    TONSILLECTOMY AND ADENOIDECTOMY      age 13 per allscripts    US GUIDED THYROID BIOPSY  10/1/2018    VARICOSE VEIN SURGERY       Family History   Problem Relation Age of Onset    Diabetes Mother     Dementia Mother     Cancer Mother 80    Alzheimer's disease Mother     Diabetes Brother     Alcohol abuse Father     Diabetes Maternal Grandmother     Diabetes Other     Heart disease Other      Social History     Social History    Marital status: /Civil Union     Spouse name: N/A    Number of children: N/A    Years of education: N/A     Occupational History    Not on file       Social History Main Topics    Smoking status: Former Smoker    Smokeless tobacco: Never Used      Comment: denied history of current every day smoker, denied history of current smoker per allscripts    Alcohol use No      Comment: social per allscripts    Drug use: No    Sexual activity: Yes     Partners: Male     Other Topics Concern    Not on file     Social History Narrative    Caffeine use    Exercising regularly               Current Outpatient Prescriptions:     calcium acetate (PHOSLO) 667 mg capsule, Take 1,334 mg by mouth 3 (three) times a day with meals, Disp: , Rfl:     cholecalciferol (VITAMIN D3) 1,000 units tablet, Take 1,000 Units by mouth daily, Disp: , Rfl:     famciclovir (FAMVIR) 500 mg tablet, , Disp: , Rfl:     Na Sulfate-K Sulfate-Mg Sulf 17 5-3 13-1 6 GM/180ML SOLN, Take 1 applicator by mouth once for 1 dose, Disp: 1 Bottle, Rfl: 0  No Known Allergies  Vitals:    10/16/18 0851   BP: 116/78 Pulse: 76   Resp: 16   Temp: 97 6 °F (36 4 °C)       Physical Exam   Constitutional: General appearance: The Patient is well-developed and well-nourished who appears the stated age in no acute distress  Patient is pleasant and talkative  HEENT:  Normocephalic  Sclerae are anicteric  Mucous membranes are moist  Neck is supple without adenopathy  No JVD  there is a palpable right thyroid nodule  Chest: The lungs are clear to auscultation  Cardiac: Heart is regular rate  Abdomen: Abdomen is soft, non-tender, non-distended and without masses  Extremities: There is no clubbing or cyanosis  There is no edema  Symmetric  Neuro: Grossly nonfocal  Gait is normal      Lymphatic: No evidence of cervical adenopathy bilaterally  No evidence of axillary adenopathy bilaterally  No evidence of inguinal adenopathy bilaterally  Skin: Warm, anicteric  Psych:  Patient is pleasant and talkative  Breasts:      Pathology:  Final Diagnosis   A  B  Thyroid, Right, mid pole ( ThinPrep and smear preparations ):  Atypia of undetermined significance (Pfeifer Category III) - See note  Hypercellular specimen  Many lymphocytes seen, few are atypical   Few follicular cells seen  Few Hurthle cells seen  Few histiocytes,  Can not rule out lymphoproliferative disorders     Satisfactory for evaluation     Note:  (1) As reported in the [address] for Reporting Thyroid Cytopathology*, this diagnostic category has demonstrated anywhere from 10-30% risk of malignancy being found in subsequent resections and/or FNA  This risk of malignancy is expected to change due to the usage of the surgical pathology diagnosis of non-invasive follicular thyroid neoplasm with papillary-like nuclear features (NIFTP)    The anticipated risk of malignancy secondary to NIFTP is 6-18%  The manual reports that the usual management following this diagnosis is repeat FNA, molecular testing, or lobectomy   Ultimately, clinical/imaging correlation for this patient is needed in arriving at the actual management plan      *The [place] System for Reporting Thyroid Cytopathology, Estefania Ma Helenilla Khang ), 2018 (2nd ed )     - Flow cytometry ([facility]# [id number], evaluated by Dr Grayson Centeno MD) reveals a small population of CD19/Cd10+ B cells with kappa excess (~2%) in a polytypic background   No evidence of T cell lymphoproliferative disorders   * Pertinent findings in Interpretation   * Viability 7AAD: 91%  * The following antigens were evaluated & found to be expressed as described above or by appropriate cells:  CD2, CD3, CD4, CD5, CD7, CD8, CD10, CD11c, CD19, CD20, CD23, CD38, CD45, CD56, CD57, sKappa, sLambda      Appropriate clinical follow up is recommended  Electronically signed by Serge Ramirez MD on 10/2/2018 at  3:59 PM   Intraoperative Consultation   Thyroid, right mid pole,  FNAB on-site evaluation: Many lymphocytes and histiocytes, few follicular cells  Sent for flow cytometry      Dr Uche Gustafson            Labs:      Imaging  Us Thyroid    Result Date: 9/19/2018  Narrative: THYROID ULTRASOUND INDICATION:    E04 1: Nontoxic single thyroid nodule  Thyroid nodule detected on recent outside cross-sectional study  COMPARISON:  None TECHNIQUE:   Ultrasound of the thyroid was performed with a high frequency linear transducer in transverse and sagittal planes including volumetric imaging sweeps as well as traditional still imaging technique  FINDINGS:  Thyroid parenchyma is diffusely heterogeneous and hyperemic in echotexture with focal nodule(s) as described below  Right lobe:  5 5 x 1 6 x 2 0 cm  Left lobe:  5 1 x 1 5 x 1 7 cm  Isthmus:  0 2 cm  Nodule #1 RIGHT upper pole nodule measuring 0 9 x 0 7 x 1 2 cm  No priors for comparison  COMPOSITION:  0 points, cystic or almost completely cystic  ECHOGENICITY:  0 points, anechoic  SHAPE:  0 points, wider-than-tall  MARGIN: 0 points, smooth   ECHOGENIC FOCI:  0 points, none or large comet-tail artifacts  TI-RADS Classification: TR 1 (0 points), Benign  No FNA  Nodule #2 RIGHT midgland nodule measuring 1 6 x 0 9 x 1 2 cm  No priors for comparison  COMPOSITION:  2 points, solid or almost completely solid   ECHOGENICITY:  2 points, hypoechoic  SHAPE:  0 points, wider-than-tall  MARGIN: 0 points, ill-defined  ECHOGENIC FOCI:  0 points, none or large comet-tail artifacts  TI-RADS Classification: TR 4 (4-6 points), Moderately suspicious  FNA if > 1 5 cm  Follow if > 1cm  Nodule #3 RIGHT lower pole nodule measuring 1 4 x 0 5 x 0 7 cm  No priors for comparison  COMPOSITION:  0 points, cystic or almost completely cystic  ECHOGENICITY:  0 points, anechoic  SHAPE:  0 points, wider-than-tall  MARGIN: 0 points, smooth  ECHOGENIC FOCI:  0 points, none or large comet-tail artifacts  TI-RADS Score: TR 1 (0 points), Benign  No FNA  Impression: Multinodular diffusely heterogeneous and hyperemic thyroid gland  The following nodule meets current ACR criteria for recommending ultrasound guided biopsy: The 1 6 x 0 9 x 1 2 cm right midpole nodule  (Image number 10, 11) (CRITERIA: TR 4, Moderately suspicious  FNA if > 1 5 cm  Reference: ACR Thyroid Imaging, Reporting and Data System (TI-RADS): White Paper of the [employer]  J AM Ru Radiol 5300;55:803-679  (additional recommendations based on American Thyroid Association 2015 guidelines ) SCRIBE ATTESTATION I, Adela Vences PA-C am acting as a scribe while in the presence of the attending physician  DELMAR Vasquez  personally reviewed and interpreted the study in this report as scribed in my presence  Workstation performed: [id number]     Us Guided Thyroid Biopsy    Result Date: 10/1/2018  Narrative: ULTRASOUND-GUIDED THYROID BIOPSY HISTORY: 66year-old with a history of thyroid nodules  Patient presents with a prescription for ultrasound-guided fine-needle aspiration biopsy of the right thyroid nodule   On previous ultrasound a right midpole thyroid nodule was identified which met criteria for fine-needle aspiration  COMPARISON: 9/18/2018 US Thyroid FINDINGS: Prior ultrasound images were reviewed  On ultrasound imaging performed today, the corresponding right midpole thyroid nodule measures 1 6 x 0 9 x 1 2 cm and will be targeted for biopsy today  The procedure was explained to the patient, including risks of hemorrhage, infection and local injury  The possibility of a nondiagnostic biopsy result and the need for repeat biopsy or sonographic follow up was explained to the patient  Informed consent was freely obtained  The patient verbalized expressed understanding of the above risks and wished to proceed with the procedure  Final standard "time-out" procedure performed  PROCEDURE: The neck was prepped and draped in normal sterile fashion  Under real-time ultrasound guidance and local anesthesia three passes with 25 gauge needles were made through the right midpole thyroid nodule  Cytopathology was present and deemed the specimens inadequate for initial evaluation  Therefore, an additional, two passes with 25 gauge needles were made through the right midpole thyroid nodule and presented to cytopathology for further evaluation  The patient tolerated the procedure well  Postprocedure instructions were provided for the patient  I asked the patient to call us with any questions, concerns, or acute problems  The patient expressed understanding of the above  Impression: Status post ultrasound-guided thyroid biopsy  The above findings and procedure were reviewed with Dr Ade Chamberlain  Procedure was performed by Pooja Vences PA-C under the direct supervision of Dr Ketty Guallpa  Workstation performed: [id number]     I reviewed the above laboratory and imaging data  Discussion/Summary:   61-year-old female with a multinodular thyroid  There are 3 nodules on the right  Two were benign by ultrasound    One met the criteria for biopsy  Biopsy revealed atypia of undetermined significance  There was a question of an underlying lymphoma  The patient has no signs or symptoms of lymphoma  There are certainly no palpable nodules and no other evidence of lymphadenopathy  I discussed with her, that in my experience that with lymphoma of the thyroid there has generally been an enlarged mass or a rapidly enlarging mass  With atypia undetermined significance, risk of an underlying malignancy is 10-13%  We discussed surgical intervention to remove the right thyroid and this would then answer the question of lymphoma as well  I also discussed repeating the thyroid biopsy with Antolin in 3 months  Since I doubt that this is lymphoma and even if this was malignant, waiting 3 months for a repeat biopsy would not alter her long-term survival I would be very comfortable with repeating her thyroid biopsy in 3 months  We also discussed risks involved with surgical resection  After some discussion we elected on repeat thyroid biopsy in 3 months after her daughter delivers at the end of January  We will schedule this with Antolin  I will see her back once we have those results  She is agreeable to this  All of her questions were answered

## 2018-10-24 LAB — TSH SERPL-ACNC: 2.92 MIU/L (ref 0.4–4.5)

## 2018-10-25 ENCOUNTER — PATIENT MESSAGE (OUTPATIENT)
Dept: HEMATOLOGY ONCOLOGY | Facility: CLINIC | Age: 63
End: 2018-10-25

## 2018-10-29 ENCOUNTER — ANESTHESIA EVENT (OUTPATIENT)
Dept: PERIOP | Facility: AMBULARY SURGERY CENTER | Age: 63
End: 2018-10-29
Payer: COMMERCIAL

## 2018-10-31 NOTE — TELEPHONE ENCOUNTER
Called and LVM for patient to call out office to get the answers to the questions that she e-mailed our office:     Per Dr Marco iGron, He agrees that her appt should be moved until after her needle aspiration that is scheduled for 02/01/19  Dr Marco Giron stated that her TSH should be managed by her PCP so she needs to contact that office to discuss the TSH further  Please R?S her appt that is scheduled for 12/17/18 to 1-2 after 02/01/19 so that we can have the results of the needle aspiration that she is getting on that day

## 2018-11-07 NOTE — ANESTHESIA PREPROCEDURE EVALUATION
Review of Systems/Medical History  Patient summary reviewed    No history of anesthetic complications     Cardiovascular  EKG reviewed, Negative cardio ROS Exercise tolerance (METS): good,     Pulmonary  Negative pulmonary ROS        GI/Hepatic  Negative GI/hepatic ROS   GERD ,        Negative  ROS        Endo/Other  Negative endo/other ROS History of thyroid disease , hypothyroidism,      GYN  Negative gynecology ROS          Hematology  Negative hematology ROS      Musculoskeletal  Negative musculoskeletal ROS   Arthritis     Neurology  Negative neurology ROS      Psychology   Negative psychology ROS          No Known Allergies     Past Medical History:   Diagnosis Date    Abnormal Pap smear of cervix 1990    Endometriosis     GERD (gastroesophageal reflux disease)     Phlebitis     Thyroid nodule 9/11/2018    Varicose vein        Past Surgical History:   Procedure Laterality Date    CERVIX SURGERY      ENDOVENOUS ABLATION SAPHENOUS VEIN W/ LASER      of incompetent vein laser last assessed 10/19/2015    GYNECOLOGIC CRYOSURGERY      early 's cervix per allscripts    OOPHORECTOMY      OOPHORECTOMY Right     [place] PHLEB VEINS - EXTREM - TO 20 Right 3/22/2016    Procedure: Right leg multiple stab phlebectomies ;  Surgeon: Frances Lindo MD;  [facility];  Service: Vascular    STERILIZATION      bilateral partial salpingectomy    TONSILLECTOMY AND ADENOIDECTOMY      age 13 per allscripts    US GUIDED THYROID BIOPSY  10/1/2018    VARICOSE VEIN SURGERY       Social History   Substance Use Topics    Smoking status: Former Smoker    Smokeless tobacco: Never Used      Comment: denied history of current every day smoker, denied history of current smoker per allscripts    Alcohol use No      Comment: social per allscripts         There were no vitals filed for this visit        Lab Results   Component Value Date    WBC 4 91 10/17/2016    HGB 14 7 10/17/2016    HCT 43 3 10/17/2016     10/17/2016    ALT 19 10/17/2016    AST 17 10/17/2016     (H) 03/14/2016    K 4 2 10/17/2016     10/17/2016    CREATININE 0 92 10/17/2016    BUN 15 10/17/2016    CO2 28 10/17/2016     No results found for: CKTOTAL, CKMB, CKMBINDEX, TROPONININo results found for: TROPONINT      QRS Axis degrees 43   T Wave Axis degrees 50   Narrative   Normal sinus rhythm  Possible Left atrial enlargement  Borderline ECG  No previous ECGs available  Confirmed by Nancy JEFFERY MD ([id number]) on 10/12/2015 1:59:24 PM                  Physical Exam    Airway    Mallampati score: II  TM Distance: >3 FB  Neck ROM: full     Dental   No notable dental hx     Cardiovascular  Comment: Negative ROS, Rhythm: regular, Cardiovascular exam normal    Pulmonary  Pulmonary exam normal     Other Findings        Anesthesia Plan  ASA Score- 2     Anesthesia Type- IV sedation with anesthesia with ASA Monitors  Additional Monitors:   Airway Plan:     Comment: I have seen the patient and reviewed the history  Patient to receive IV sedation with full ASA monitors  Risks discussed with the patient, consent signed        Plan Factors-    Induction- intravenous  Postoperative Plan-     Informed Consent- Anesthetic plan and risks discussed with patient  I personally reviewed this patient with the CRNA  Discussed and agreed on the Anesthesia Plan with the CRNA  Ángel Kerns

## 2018-11-08 ENCOUNTER — HOSPITAL ENCOUNTER (OUTPATIENT)
Facility: AMBULARY SURGERY CENTER | Age: 63
Setting detail: OUTPATIENT SURGERY
Discharge: HOME | End: 2018-11-08
Attending: INTERNAL MEDICINE | Admitting: INTERNAL MEDICINE
Payer: COMMERCIAL

## 2018-11-08 ENCOUNTER — ANESTHESIA (OUTPATIENT)
Dept: PERIOP | Facility: AMBULARY SURGERY CENTER | Age: 63
End: 2018-11-08
Payer: COMMERCIAL

## 2018-11-08 VITALS
OXYGEN SATURATION: 100 % | DIASTOLIC BLOOD PRESSURE: 52 MMHG | HEART RATE: 67 BPM | SYSTOLIC BLOOD PRESSURE: 100 MMHG | RESPIRATION RATE: 20 BRPM | TEMPERATURE: 98.2 F | BODY MASS INDEX: 30.73 KG/M2 | WEIGHT: 180 LBS | HEIGHT: 64 IN

## 2018-11-08 DIAGNOSIS — Z12.11 SCREEN FOR COLON CANCER: ICD-10-CM

## 2018-11-08 DIAGNOSIS — K21.9 GASTROESOPHAGEAL REFLUX DISEASE WITHOUT ESOPHAGITIS: ICD-10-CM

## 2018-11-08 PROCEDURE — 88305 TISSUE EXAM BY PATHOLOGIST: CPT | Performed by: PATHOLOGY

## 2018-11-08 PROCEDURE — 45380 COLONOSCOPY AND BIOPSY: CPT | Performed by: INTERNAL MEDICINE

## 2018-11-08 PROCEDURE — 45385 COLONOSCOPY W/LESION REMOVAL: CPT | Performed by: INTERNAL MEDICINE

## 2018-11-08 RX ORDER — ONDANSETRON 2 MG/ML
INJECTION INTRAMUSCULAR; INTRAVENOUS AS NEEDED
Status: DISCONTINUED | OUTPATIENT
Start: 2018-11-08 | End: 2018-11-08 | Stop reason: SURG

## 2018-11-08 RX ORDER — ALBUTEROL SULFATE 2.5 MG/3ML
2.5 SOLUTION RESPIRATORY (INHALATION) ONCE AS NEEDED
Status: DISCONTINUED | OUTPATIENT
Start: 2018-11-08 | End: 2018-11-08 | Stop reason: HOSPADM

## 2018-11-08 RX ORDER — ONDANSETRON 2 MG/ML
4 INJECTION INTRAMUSCULAR; INTRAVENOUS ONCE AS NEEDED
Status: DISCONTINUED | OUTPATIENT
Start: 2018-11-08 | End: 2018-11-08 | Stop reason: HOSPADM

## 2018-11-08 RX ORDER — PROPOFOL 10 MG/ML
INJECTION, EMULSION INTRAVENOUS AS NEEDED
Status: DISCONTINUED | OUTPATIENT
Start: 2018-11-08 | End: 2018-11-08 | Stop reason: SURG

## 2018-11-08 RX ORDER — SODIUM CHLORIDE 9 MG/ML
125 INJECTION, SOLUTION INTRAVENOUS CONTINUOUS
Status: DISCONTINUED | OUTPATIENT
Start: 2018-11-08 | End: 2018-11-08 | Stop reason: HOSPADM

## 2018-11-08 RX ORDER — MEPERIDINE HYDROCHLORIDE 25 MG/ML
12.5 INJECTION INTRAMUSCULAR; INTRAVENOUS; SUBCUTANEOUS AS NEEDED
Status: DISCONTINUED | OUTPATIENT
Start: 2018-11-08 | End: 2018-11-08 | Stop reason: HOSPADM

## 2018-11-08 RX ORDER — LIDOCAINE HYDROCHLORIDE 10 MG/ML
INJECTION, SOLUTION INFILTRATION; PERINEURAL AS NEEDED
Status: DISCONTINUED | OUTPATIENT
Start: 2018-11-08 | End: 2018-11-08 | Stop reason: SURG

## 2018-11-08 RX ORDER — PROPOFOL 10 MG/ML
INJECTION, EMULSION INTRAVENOUS CONTINUOUS PRN
Status: DISCONTINUED | OUTPATIENT
Start: 2018-11-08 | End: 2018-11-08 | Stop reason: SURG

## 2018-11-08 RX ADMIN — SODIUM CHLORIDE 125 ML/HR: 0.9 INJECTION, SOLUTION INTRAVENOUS at 07:24

## 2018-11-08 RX ADMIN — LIDOCAINE HYDROCHLORIDE 50 MG: 10 INJECTION, SOLUTION INFILTRATION; PERINEURAL at 08:05

## 2018-11-08 RX ADMIN — PROPOFOL 40 MG: 10 INJECTION, EMULSION INTRAVENOUS at 08:10

## 2018-11-08 RX ADMIN — PROPOFOL 60 MG: 10 INJECTION, EMULSION INTRAVENOUS at 08:05

## 2018-11-08 RX ADMIN — PROPOFOL 100 MCG/KG/MIN: 10 INJECTION, EMULSION INTRAVENOUS at 08:05

## 2018-11-08 RX ADMIN — ONDANSETRON 4 MG: 2 INJECTION INTRAMUSCULAR; INTRAVENOUS at 08:15

## 2018-11-08 NOTE — OP NOTE
Colonoscopy Procedure Note    Procedure: Colonoscopy    Sedation: Monitored anesthesia care, check anesthesia records      ASA Class: 2    INDICATIONS:  Colon cancer screening  POST-OP DIAGNOSIS: See the impression below    Procedure Details     Prior colonoscopy: No prior colonoscopy  Informed consent was obtained for the procedure, including sedation  Risks of perforation, hemorrhage, adverse drug reaction and aspiration were discussed  The patient was placed in the left lateral decubitus position  Based on the pre-procedure assessment, including review of the patient's medical history, medications, allergies, and review of systems, she had been deemed to be an appropriate candidate for conscious sedation; she was therefore sedated with the medications listed below  The patient was monitored continuously with telemetry, pulse oximetry, blood pressure monitoring, and direct observations  A rectal examination was performed  The variable-stiffness pediatric colonoscope was inserted into the rectum and advanced under direct vision to the cecum, which was identified by the ileocecal valve and appendiceal orifice  The quality of the colonic preparation was good  A careful inspection was made as the colonoscope was withdrawn, including a retroflexed view of the rectum; findings and interventions are described below  Findings:  1  3 mm sessile polyp noted in the cecum, removed using cold biopsy forceps  2  1 cm sessile polyp noted in the ascending colon, removed using Exacto snare polypectomy in its entirety  3  Few isolated diverticula noted in the sigmoid colon  4  Retroflexed view was notable for small internal hemorrhoids  Complications: None; patient tolerated the procedure well  Impression:    1  Two colon polyps  2  Mild sigmoid diverticulosis  3  Small internal hemorrhoids  Recommendations:  Repeat colonoscopy in 3 years if polyp is an adenoma  High-fiber diet    Follow-up biopsy results in 2-3 weeks

## 2018-11-08 NOTE — H&P
History and Physical -  [facility]  Demian Bowman 66 y o  female MRN: [id number]    HPI: Demian Bowman is a 66y o  year old female who presents with colon cancer screening        Review of Systems    Historical Information   Past Medical History:   Diagnosis Date    Abnormal Pap smear of cervix 1990    Endometriosis     GERD (gastroesophageal reflux disease)     Multiple sclerosis (HCC)     Phlebitis     Thyroid nodule 9/11/2018    Varicose vein      Past Surgical History:   Procedure Laterality Date    CERVIX SURGERY      ENDOVENOUS ABLATION SAPHENOUS VEIN W/ LASER      of incompetent vein laser last assessed 10/19/2015    GYNECOLOGIC CRYOSURGERY      early 's cervix per allscripts    OOPHORECTOMY      OOPHORECTOMY Right     [place] PHLEB VEINS - EXTREM - TO 20 Right 3/22/2016    Procedure: Right leg multiple stab phlebectomies ;  Surgeon: Claude Staggers, MD;  [facility];  Service: Vascular    STERILIZATION      bilateral partial salpingectomy    TONSILLECTOMY AND ADENOIDECTOMY      age 13 per allscripts    TUBAL LIGATION      US GUIDED THYROID BIOPSY  10/1/2018    VARICOSE VEIN SURGERY       Social History   History   Alcohol Use No     Comment: social per allscripts     History   Drug Use No     History   Smoking Status    Former Smoker   Smokeless Tobacco    Never Used     Comment: denied history of current every day smoker, denied history of current smoker per allscripts     Family History   Problem Relation Age of Onset    Diabetes Mother     Dementia Mother     Cancer Mother 80    Alzheimer's disease Mother     Diabetes Brother     Alcohol abuse Father     Diabetes Maternal Grandmother     Diabetes Other     Heart disease Other        Meds/Allergies     Prescriptions Prior to Admission   Medication    b complex vitamins tablet    BIOTIN PO    calcium acetate (PHOSLO) 667 mg capsule    cholecalciferol (VITAMIN D3) 1,000 units tablet    famciclovir [facility]) 500 mg tablet       No Known Allergies    Objective     Blood pressure 121/60, pulse 68, temperature (!) 97 4 °F (36 3 °C), temperature source Temporal, resp  rate 18, height 5' 4" (1 626 m), weight 81 6 kg (180 lb), SpO2 97 %  PHYSICAL EXAM    Gen: NAD  CV: RRR  CHEST: Clear  ABD: soft, NT/ND  EXT: no edema  Neuro: AAO      ASSESSMENT/PLAN:  This is a 66y o  year old female here for colon cancer screening,  PLAN:   Procedure: Colonoscopy

## 2018-11-08 NOTE — ANESTHESIA POSTPROCEDURE EVALUATION
Post-Op Assessment Note      CV Status:  Stable    Mental Status:  Awake    Hydration Status:  Stable    PONV Controlled:  Controlled    Airway Patency:  Patent        Staff: CRNA           BP 98/70 (11/08/18 0833)    Temp 98 2 °F (36 8 °C) (11/08/18 0833)    Pulse 57 (11/08/18 0833)   Resp 20 (11/08/18 0833)    SpO2 100 % (11/08/18 3062)

## 2018-11-20 ENCOUNTER — TELEPHONE (OUTPATIENT)
Dept: GASTROENTEROLOGY | Facility: CLINIC | Age: 63
End: 2018-11-20

## 2018-11-20 NOTE — LETTER
November 20, 2018    Jeff Reyes [place] [address]      Dear Ms Coyne: We have attempted to reach you regarding your results with no response  We ask that you contact our office upon receipt of this letter to receive your results  Thank you in advance for your cooperation and assistance        Sincerely,           Lavern Madrigal [facility]

## 2018-11-20 NOTE — TELEPHONE ENCOUNTER
----- Message from Antony Dias MD sent at 11/19/2018  5:51 PM EST -----  Please inform the patient that the polyp removed was sessile serrated adenoma, would recommend repeat colonoscopy in 3 years

## 2018-12-18 ENCOUNTER — TELEPHONE (OUTPATIENT)
Dept: FAMILY MEDICINE CLINIC | Facility: MEDICAL CENTER | Age: 63
End: 2018-12-18

## 2018-12-18 NOTE — TELEPHONE ENCOUNTER
Patient called the office stating her Hematologist and Oncologist would like her to get another US guided thyroid biopsy  Patient will be getting a second opinion with [facility]  Patient needed the diagnosis cpt code, provided the cpt code in chart from last US thyroid biopsy on 10/01/2018  Routed to Dr Rose Landaverde as an [address]

## 2019-02-04 ENCOUNTER — TELEPHONE (OUTPATIENT)
Dept: SURGICAL ONCOLOGY | Facility: CLINIC | Age: 64
End: 2019-02-04

## 2019-02-04 NOTE — TELEPHONE ENCOUNTER
Pt does not wish to have thyroid follow-up with our office  They are seeing Dr Tish Willson at [place]

## 2019-02-04 NOTE — TELEPHONE ENCOUNTER
----- Message from [employer] sent at 2/4/2019  2:51 PM EST -----  Regarding: Follow Up Merari Ordonez just called she cancelled her appointment for Tuesday 2-12-19; she stated that her and her  decided that they will not follow through with the needle aspiration  She decided not to reschedule with Dr Juventino Martini

## 2019-02-22 ENCOUNTER — TELEPHONE (OUTPATIENT)
Dept: FAMILY MEDICINE CLINIC | Facility: MEDICAL CENTER | Age: 64
End: 2019-02-22

## 2019-02-22 DIAGNOSIS — K21.9 GASTROESOPHAGEAL REFLUX DISEASE WITHOUT ESOPHAGITIS: Primary | ICD-10-CM

## 2019-02-22 DIAGNOSIS — G35 MULTIPLE SCLEROSIS (HCC): Primary | ICD-10-CM

## 2019-02-22 NOTE — TELEPHONE ENCOUNTER
----- Message from Fatoumata Bethea MD sent at 2/22/2019 12:28 PM EST -----  Please call patient, she is requesting a referral to [facility] for MS    She has the info, please do

## 2019-02-22 NOTE — TELEPHONE ENCOUNTER
S/w patient  Referral placed for [facility] medicine Neurology department  Needs to be faxed to [phone] attn: Chary Orozco patient to see if she wants records sent from her Neurologist at [facility]?   vm full   Unable to leave message  Will try again

## 2019-05-17 ENCOUNTER — ANNUAL EXAM (OUTPATIENT)
Dept: OBGYN CLINIC | Facility: CLINIC | Age: 64
End: 2019-05-17
Payer: COMMERCIAL

## 2019-05-17 VITALS
BODY MASS INDEX: 33.03 KG/M2 | WEIGHT: 192.44 LBS | DIASTOLIC BLOOD PRESSURE: 72 MMHG | SYSTOLIC BLOOD PRESSURE: 122 MMHG

## 2019-05-17 DIAGNOSIS — Z01.419 WELL WOMAN EXAM WITH ROUTINE GYNECOLOGICAL EXAM: Primary | ICD-10-CM

## 2019-05-17 PROCEDURE — 99396 PREV VISIT EST AGE 40-64: CPT | Performed by: OBSTETRICS & GYNECOLOGY

## 2019-05-30 ENCOUNTER — HOSPITAL ENCOUNTER (OUTPATIENT)
Dept: RADIOLOGY | Facility: MEDICAL CENTER | Age: 64
Discharge: HOME | End: 2019-05-30
Attending: OBSTETRICS & GYNECOLOGY
Payer: COMMERCIAL

## 2019-05-30 VITALS — BODY MASS INDEX: 32.78 KG/M2 | WEIGHT: 192 LBS | HEIGHT: 64 IN

## 2019-05-30 DIAGNOSIS — Z12.31 ENCOUNTER FOR SCREENING MAMMOGRAM FOR MALIGNANT NEOPLASM OF BREAST: ICD-10-CM

## 2019-05-30 PROCEDURE — 77067 SCR MAMMO BI INCL CAD: CPT

## 2019-06-23 ENCOUNTER — APPOINTMENT (OUTPATIENT)
Dept: RADIOLOGY | Facility: CLINIC | Age: 64
End: 2019-06-23
Payer: COMMERCIAL

## 2019-06-23 ENCOUNTER — OFFICE VISIT (OUTPATIENT)
Dept: URGENT CARE | Facility: CLINIC | Age: 64
End: 2019-06-23
Payer: COMMERCIAL

## 2019-06-23 VITALS
RESPIRATION RATE: 20 BRPM | HEART RATE: 67 BPM | SYSTOLIC BLOOD PRESSURE: 121 MMHG | TEMPERATURE: 98.6 F | OXYGEN SATURATION: 99 % | DIASTOLIC BLOOD PRESSURE: 59 MMHG

## 2019-06-23 DIAGNOSIS — M25.571 ACUTE RIGHT ANKLE PAIN: Primary | ICD-10-CM

## 2019-06-23 DIAGNOSIS — M79.671 RIGHT FOOT PAIN: ICD-10-CM

## 2019-06-23 PROCEDURE — 73610 X-RAY EXAM OF ANKLE: CPT

## 2019-06-23 PROCEDURE — 73630 X-RAY EXAM OF FOOT: CPT

## 2019-06-23 PROCEDURE — G0382 LEV 3 HOSP TYPE B ED VISIT: HCPCS | Performed by: PHYSICIAN ASSISTANT

## 2019-06-23 RX ORDER — CLEMASTINE FUMARATE 2.68 MG
2.68 TABLET ORAL
COMMUNITY
Start: 2019-05-31

## 2019-07-18 DIAGNOSIS — B00.1 HERPES SIMPLEX LABIALIS: Primary | ICD-10-CM

## 2019-07-18 RX ORDER — FAMCICLOVIR 500 MG/1
500 TABLET, FILM COATED ORAL ONCE
Qty: 3 TABLET | Refills: 5 | Status: SHIPPED | OUTPATIENT
Start: 2019-07-18 | End: 2019-07-20 | Stop reason: SDUPTHER

## 2019-07-18 RX ORDER — FAMCICLOVIR 500 MG/1
TABLET, FILM COATED ORAL
Status: CANCELLED | OUTPATIENT
Start: 2019-07-18

## 2019-07-19 ENCOUNTER — OFFICE VISIT (OUTPATIENT)
Dept: FAMILY MEDICINE CLINIC | Facility: MEDICAL CENTER | Age: 64
End: 2019-07-19
Payer: COMMERCIAL

## 2019-07-19 VITALS
BODY MASS INDEX: 35.08 KG/M2 | SYSTOLIC BLOOD PRESSURE: 116 MMHG | HEART RATE: 68 BPM | DIASTOLIC BLOOD PRESSURE: 70 MMHG | RESPIRATION RATE: 16 BRPM | HEIGHT: 63 IN | WEIGHT: 198 LBS

## 2019-07-19 DIAGNOSIS — S93.401D SPRAIN OF RIGHT ANKLE, UNSPECIFIED LIGAMENT, SUBSEQUENT ENCOUNTER: Primary | ICD-10-CM

## 2019-07-19 PROBLEM — G35 MULTIPLE SCLEROSIS (HCC): Status: ACTIVE | Noted: 2019-07-19

## 2019-07-19 PROCEDURE — 99213 OFFICE O/P EST LOW 20 MIN: CPT | Performed by: FAMILY MEDICINE

## 2019-07-20 DIAGNOSIS — B00.1 HERPES SIMPLEX LABIALIS: ICD-10-CM

## 2019-07-22 RX ORDER — FAMCICLOVIR 500 MG/1
500 TABLET, FILM COATED ORAL ONCE
Qty: 3 TABLET | Refills: 0 | Status: SHIPPED | OUTPATIENT
Start: 2019-07-22 | End: 2019-07-26 | Stop reason: SDUPTHER

## 2019-07-22 NOTE — PROGRESS NOTES
Patient had an inversion injury of her right ankle  This happened about a month ago  She still has some pain, however the pain continues to improve as does her mobility  She just wants me to check her out today to make sure that there is no DVT  She has noticed some ankle edema  She has an air cast which she has not been using  /70 (BP Location: Left arm, Patient Position: Sitting, Cuff Size: Large)   Pulse 68   Resp 16   Ht 5' 3" (1 6 m)   Wt 89 8 kg (198 lb)   BMI 35 07 kg/m²     Patient's right ankle is stable  There is a small amount of edema around the ankle joint  There is no calf tenderness there is no significant generalized edema  There is no venous dilation  Both calves are essentially normal     Ankle sprain    I have suggested that she begin to use that air cast   That may give her more mobility and she may start to heal this sprain better

## 2019-07-26 DIAGNOSIS — B00.1 HERPES SIMPLEX LABIALIS: ICD-10-CM

## 2019-07-26 RX ORDER — FAMCICLOVIR 500 MG/1
500 TABLET, FILM COATED ORAL ONCE
Qty: 3 TABLET | Refills: 0 | Status: SHIPPED | OUTPATIENT
Start: 2019-07-26 | End: 2019-07-31

## 2019-07-26 NOTE — TELEPHONE ENCOUNTER
----- Message from Juan Vick sent at 7/25/2019  9:20 PM EDT -----  Regarding: Prescription Question  Contact: [phone]  New prescription for Famciclovir 500mg was not correct  It was sent to pharmacy as  1 tab (500 mg) at onset of coldsore  It is supposed to be 500mg tabs 3 (1500 mg) at onset of coldsore  I don't know how the dosage was changed on my chart but [facility] probably has a record of old script  Can you please correct this?

## 2019-07-31 RX ORDER — FAMCICLOVIR 500 MG/1
1500 TABLET, FILM COATED ORAL ONCE
Qty: 3 TABLET | Refills: 0 | Status: SHIPPED | OUTPATIENT
Start: 2019-07-31 | End: 2020-01-06 | Stop reason: SDUPTHER

## 2019-08-13 DIAGNOSIS — S93.401D SPRAIN OF RIGHT ANKLE, UNSPECIFIED LIGAMENT, SUBSEQUENT ENCOUNTER: Primary | ICD-10-CM

## 2019-08-19 ENCOUNTER — OFFICE VISIT (OUTPATIENT)
Dept: OBGYN CLINIC | Facility: CLINIC | Age: 64
End: 2019-08-19
Payer: COMMERCIAL

## 2019-08-19 VITALS
SYSTOLIC BLOOD PRESSURE: 94 MMHG | HEIGHT: 64 IN | HEART RATE: 67 BPM | DIASTOLIC BLOOD PRESSURE: 61 MMHG | WEIGHT: 191 LBS | BODY MASS INDEX: 32.61 KG/M2

## 2019-08-19 DIAGNOSIS — S93.401A SPRAIN OF UNSPECIFIED LIGAMENT OF RIGHT ANKLE, INITIAL ENCOUNTER: Primary | ICD-10-CM

## 2019-08-19 DIAGNOSIS — S93.401D SPRAIN OF RIGHT ANKLE, UNSPECIFIED LIGAMENT, SUBSEQUENT ENCOUNTER: ICD-10-CM

## 2019-08-19 PROCEDURE — 99243 OFF/OP CNSLTJ NEW/EST LOW 30: CPT | Performed by: ORTHOPAEDIC SURGERY

## 2019-08-19 NOTE — PROGRESS NOTES
Assessment/Plan:  1  Sprain of unspecified ligament of right ankle, initial encounter  Ambulatory referral to Physical Therapy   2  Sprain of right ankle, unspecified ligament, subsequent encounter  Ambulatory referral to Orthopedic Surgery       Scribe Attestation    I,:   Neptali Carrasquillo am acting as a scribe while in the presence of the attending physician :        I,:   Preethi Kim MD personally performed the services described in this documentation    as scribed in my presence :          I do not appreciate any obvious fractures on Susan's right ankle x-rays today  However, I do notice some widening of her superior lateral mortise  She is now 2 months out from initial injury and has not done any rehabilitation for this ankle  She does have some instability about the ankle, however she does not report any pain at this time  I would like to start her off with formal physical therapy  I have provided her with a prescription for this today  We did discuss under rehabilitation for an ankle sprain can cause the chronic pain that she is experiencing  I will have her follow back in the office in 4 weeks  If she denies any improvement, we will consider ordering her an MRI to assess the ligaments surrounding her mortise  Subjective:   Darin Sahni is a 60 y o  female who presents to the office today for initial evaluation of right ankle pain now status post 2 months from initial injury  She states she was walking down the stairs and tripped  She reports initial swelling and bruising, however she was able to ambulate  She notes going to urgent care 1 month later due to the continued pain, swelling and bruising  She does report a history of multiple sclerosis and 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  At today's visit, she does not note any pain since she has not been ambulating much today  She reports when she does have pain it is constant, dull and mild to moderate in intensity    She reports taking ibuprofen as needed with relief  She notes difficulties with activities of daily living such as walking up and down stairs  She denies any radicular symptoms  She denies any numbness and tingling  Review of Systems   Constitutional: Negative for chills, fever and unexpected weight change  HENT: Negative for hearing loss, nosebleeds and sore throat  Eyes: Negative for pain, redness and visual disturbance  Respiratory: Negative for cough, shortness of breath and wheezing  Cardiovascular: Negative for chest pain, palpitations and leg swelling  Gastrointestinal: Negative for abdominal pain, nausea and vomiting  Endocrine: Negative for polydipsia and polyuria  Genitourinary: Negative for dysuria and hematuria  Musculoskeletal: Positive for arthralgias and joint swelling (right ankle)  See HPI   Skin: Negative for rash and wound  Neurological: Negative for dizziness, numbness and headaches  Psychiatric/Behavioral: Negative for decreased concentration and suicidal ideas  The patient is not nervous/anxious  Past Medical History:   Diagnosis Date    Abnormal Pap smear of cervix 1990    Endometriosis     GERD (gastroesophageal reflux disease)     Multiple sclerosis (HCC)     Osteoarthritis     Phlebitis     Thyroid nodule 9/11/2018    Varicose vein        Past Surgical History:   Procedure Laterality Date    CERVIX SURGERY      ENDOVENOUS ABLATION SAPHENOUS VEIN W/ LASER      of incompetent vein laser last assessed 10/19/2015    GYNECOLOGIC CRYOSURGERY      early 's cervix per allscripts    OOPHORECTOMY      OOPHORECTOMY Right     SC COLONOSCOPY FLX DX W/COLLJ SPEC WHEN PFRMD N/A 11/8/2018    Procedure: COLONOSCOPY;  Surgeon: Gaetano Zuluaga MD;  Location[facility];   Service: Gastroenterology    SC PHLEB VEINS - EXTREM - TO 20 Right 3/22/2016    Procedure: Right leg multiple stab phlebectomies ;  Surgeon: Amber Fox MD;  Location: [facility]; Service: Vascular    STERILIZATION      bilateral partial salpingectomy    TONSILLECTOMY AND ADENOIDECTOMY      age 13 per allscripts    TUBAL LIGATION      US GUIDED THYROID BIOPSY  10/1/2018    VARICOSE VEIN SURGERY         Family History   Problem Relation Age of Onset    Diabetes Mother     Dementia Mother     Cancer Mother 80    Alzheimer's disease Mother     Diabetes Brother     Alcohol abuse Father     No Known Problems Brother     Diabetes Maternal Grandmother     Diabetes Other     Heart disease Other     No Known Problems Daughter     No Known Problems Maternal Grandfather     No Known Problems Paternal Grandmother     No Known Problems Paternal Grandfather        Social History     Occupational History    Not on file   Tobacco Use    Smoking status: Former Smoker    Smokeless tobacco: Never Used    Tobacco comment: denied history of current every day smoker, denied history of current smoker per allscripts   Substance and Sexual Activity    Alcohol use: No     Comment: social per allscripts    Drug use: No    Sexual activity: Yes     Partners: Male         Current Outpatient Medications:     b complex vitamins tablet, Take 1 tablet by mouth daily, Disp: , Rfl:     BIOTIN PO, Take by mouth daily, Disp: , Rfl:     calcium acetate (PHOSLO) 667 mg capsule, Take 1,334 mg by mouth daily  , Disp: , Rfl:     cholecalciferol (VITAMIN D3) 1,000 units tablet, Take 1,000 Units by mouth daily, Disp: , Rfl:     clemastine (TAVIST) 2 68 MG TABS, Take 2 68 mg by mouth, Disp: , Rfl:     famciclovir (FAMVIR) 500 mg tablet, Take 3 tablets (1,500 mg total) by mouth once for 1 dose (Patient taking differently: Take 1,500 mg by mouth as needed ), Disp: 3 tablet, Rfl: 0    metroNIDAZOLE (METROCREAM) 0 75 % cream, APPLY TOPICALLY TO FACE TWICE DAILY, Disp: , Rfl: 1    ocrelizumab (OCREVUS) 300 MG/10ML SOLN, Infuse into a venous catheter once, Disp: , Rfl:     No Known Allergies    Objective:  Vitals:    08/19/19 0756   BP: 94/61   Pulse: 67       Right Ankle Exam     Tenderness   The patient is experiencing no tenderness  Swelling: mild    Range of Motion   Dorsiflexion: 5   Plantar flexion: 15     Muscle Strength   Dorsiflexion:  5/5  Plantar flexion:  5/5  Anterior tibial:  5/5    Tests   Anterior drawer: 1+  Varus tilt: negative    Other   Erythema: absent  Scars: absent  Sensation: normal  Pulse: present     Comments:  Squeeze (-)          Observations     Right Ankle/Foot   Negative for adhesive scar  Strength/Myotome Testing     Right Ankle/Foot   Dorsiflexion: 5  Plantar flexion: 5      Physical Exam   Constitutional: She is oriented to person, place, and time  She appears well-developed and well-nourished  HENT:   Head: Normocephalic and atraumatic  Eyes: Pupils are equal, round, and reactive to light  Conjunctivae are normal  Right eye exhibits no discharge  Left eye exhibits no discharge  Neck: Normal range of motion  Neck supple  Cardiovascular: Normal rate and intact distal pulses  Pulmonary/Chest: Effort normal  No respiratory distress  Neurological: She is alert and oriented to person, place, and time  Skin: Skin is warm and dry  Vitals reviewed  I have personally reviewed pertinent films in PACS and my interpretation is as follows:  X-rays of the right ankle obtained on 06/23/2019 demonstrate a widening of the superior lateral aspect of the mortise

## 2019-08-21 ENCOUNTER — EVALUATION (OUTPATIENT)
Dept: PHYSICAL THERAPY | Facility: REHABILITATION | Age: 64
End: 2019-08-21
Payer: COMMERCIAL

## 2019-08-21 DIAGNOSIS — S93.401D SPRAIN OF LIGAMENT OF RIGHT ANKLE, SUBSEQUENT ENCOUNTER: Primary | ICD-10-CM

## 2019-08-21 PROCEDURE — 97161 PT EVAL LOW COMPLEX 20 MIN: CPT | Performed by: PHYSICAL THERAPIST

## 2019-08-21 PROCEDURE — 97110 THERAPEUTIC EXERCISES: CPT | Performed by: PHYSICAL THERAPIST

## 2019-08-21 NOTE — PROGRESS NOTES
PT Evaluation     Today's date: 2019  Patient name: Juan Vick  : 1955  MRN: [id number]  Referring provider: Rica Lomas MD  Dx:   Encounter Diagnosis     ICD-10-CM    1  Sprain of ligament of right ankle, subsequent encounter S93 401D        Start Time: 1005  Stop Time: 1100  Total time in clinic (min): 55 minutes    Assessment  Assessment details: Juan Vick is a 60 y o  female present with:   Sprain of ligament of right ankle, subsequent encounter  (primary encounter diagnosis)    Bakari Alvarado has the above listed impairments and will benefit from skilled Physical Therapist management to improve deficits to return to prior level of function   Bakari Alvarado presenting with decreased ankle range of motion and strength secondary to her ankle sprain as well as concomitant  Impairments: abnormal muscle firing, abnormal or restricted ROM, activity intolerance, impaired physical strength, lacks appropriate home exercise program and pain with function  Understanding of Dx/Px/POC: good   Prognosis: good    Goals  Impairment Goals  - Decrease pain 0/10  - Improve ROM to 20 degrees dorsiflexion  - Increase strength to 5/5 throughout    Functional Goals  - Return to Prior Level of Function  - Increase Functional Status Measure to: 79  - Patient will be independent with HEP  -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 as well as nithya     Plan  Patient would benefit from: skilled PT  Planned therapy interventions: joint mobilization, manual therapy, patient education, postural training, activity modification, abdominal trunk stabilization, body mechanics training, flexibility, functional ROM exercises, graded exercise, home exercise program, neuromuscular re-education, strengthening, stretching, therapeutic activities and therapeutic exercise  Frequency: 2x week  Duration in weeks: 8  Plan of Care beginning date: 2019  Plan of Care expiration date: 10/16/2019  Treatment plan discussed with: patient        Subjective Evaluation    History of Present Illness  Mechanism of injury: Gatito Terrell notes spraining her right ankle 2 months ago 19 while coming back from vacation  Reports history of MS as well dropfoot within right ankle  Notes continued difficulty with going down steps as well as prolonged ambulation  It's worse at night, "i'll take some ibuprofen at night if I need it " Notes going to the gym currently  Line dancing and [place]  Pain  Current pain ratin  At worst pain ratin  Location: R anterior ankle  Quality: dull ache  Relieving factors: rest  Aggravating factors: stair climbing, walking, standing, lifting and running  Progression: improved    Social Support    Employment status: not working  Patient Goals  Patient goals for therapy: decreased pain, increased motion, increased strength and return to sport/leisure activities          Objective     Tenderness     Right Ankle/Foot   Tenderness in the anterior ankle, anterior talofibular ligament, deltoid ligament and dorsum foot  No tenderness in the Achilles insertion, mid-plantar aspect and navicular  Joint Play   Left Ankle/Foot  Joints within functional limits are the fibular head  Right Ankle/Foot  Hypomobile in the fibular head  Strength/Myotome Testing     Left Ankle/Foot   Dorsiflexion: 5  Plantar flexion: 5  Inversion: 5  Eversion: 5    Right Ankle/Foot   Dorsiflexion: 4-  Plantar flexion: 4-  Inversion: 3+  Eversion: 4-    Tests     Right Ankle/Foot   Negative for anterior drawer (laxity detected however felt symmetrical to L foot)              Precautions: MS, OA    Daily Treatment Diary    Manual 2019        MWM standing dorsiflexion mob 5'                 Ankle Exercises         Bike nv                 Ankle TB inv/ev/PF* GTB 3x15        Ankle DF w toes curled GTB 3x15        Bridges nv                          ROM         Gastroc stretch* 3x30"        Soleus stretch TA/Closed Chain                  Standing eccentric heel raises         Heel/toe raises nv        TG Squats         Band walks eversion biased         Step ups (involved LE up, uninvolved down)                  Step overs nv        Mini squats w ue support         STS from chair/low mat nv        Squats w target         Goblet squats                  Balance/Proprio         BAPS board sitting nv        FTEO         Tandem         SL* 10"x10        Tandem ambulation         Marches                  SL skaters         SL ball toss         SL on foam         SL skaters on foam                  * = on hep

## 2019-08-27 ENCOUNTER — OFFICE VISIT (OUTPATIENT)
Dept: PHYSICAL THERAPY | Facility: REHABILITATION | Age: 64
End: 2019-08-27
Payer: COMMERCIAL

## 2019-08-27 DIAGNOSIS — S93.401D SPRAIN OF LIGAMENT OF RIGHT ANKLE, SUBSEQUENT ENCOUNTER: Primary | ICD-10-CM

## 2019-08-27 PROCEDURE — 97140 MANUAL THERAPY 1/> REGIONS: CPT | Performed by: PHYSICAL THERAPIST

## 2019-08-27 PROCEDURE — 97110 THERAPEUTIC EXERCISES: CPT | Performed by: PHYSICAL THERAPIST

## 2019-08-27 NOTE — PROGRESS NOTES
Daily Note     Today's date: 2019  Patient name: Darin Sahni  : 1955  MRN: [id number]  Referring provider: Jennifer Thorne MD  Dx:   Encounter Diagnosis     ICD-10-CM    1  Sprain of ligament of right ankle, subsequent encounter S93 401D                   Subjective: Pt reported that her exercises are going well and she needs to leave a little early secondary to a prior commitment  Objective: See treatment diary below        Assessment: Tolerated treatment well  Patient would benefit from continued PT  Pt was able to perform all exercises with good form  Pt continues to feel anterior ankle pain with end range dorsiflexion, minimized with MWM  Active DF reported as extremely difficult secondary to weakness  Plan: Continue per plan of care        Precautions: MS, OA    Daily Treatment Diary    Manual 2019       MWM standing dorsiflexion mob 5' 5'       sittind DF posterior talar mob  3'       Ankle Exercises         Bike nv 5'                Ankle TB inv/ev/PF* GTB 3x15 GTB 3x15       Ankle DF w toes curled GTB 3x15 GTB 3x15       Bridges nv nv                         ROM         Gastroc stretch* 3x30" 3x30"       Soleus stretch                                             TA/Closed Chain                  Standing eccentric heel raises         Heel/toe raises nv 2x10 ea       TG Squats         Band walks eversion biased         Step ups (involved LE up, uninvolved down)                  Step overs nv x15 2in exxagerated knee flexion       Mini squats w ue support         STS from chair/low mat nv nv       Squats w target         Goblet squats                  Balance/Proprio         BAPS board sitting nv x20 cw/ccw       FTEO         Tandem         SL* 10"x10 10"x10       Tandem ambulation         Marches                  SL skaters         SL ball toss         SL on foam         SL skaters on foam                  * = on hep

## 2019-08-30 ENCOUNTER — OFFICE VISIT (OUTPATIENT)
Dept: PHYSICAL THERAPY | Facility: REHABILITATION | Age: 64
End: 2019-08-30
Payer: COMMERCIAL

## 2019-08-30 DIAGNOSIS — S93.401D SPRAIN OF LIGAMENT OF RIGHT ANKLE, SUBSEQUENT ENCOUNTER: Primary | ICD-10-CM

## 2019-08-30 PROCEDURE — 97140 MANUAL THERAPY 1/> REGIONS: CPT | Performed by: PHYSICAL MEDICINE & REHABILITATION

## 2019-08-30 PROCEDURE — 97110 THERAPEUTIC EXERCISES: CPT | Performed by: PHYSICAL MEDICINE & REHABILITATION

## 2019-08-30 PROCEDURE — 97112 NEUROMUSCULAR REEDUCATION: CPT | Performed by: PHYSICAL MEDICINE & REHABILITATION

## 2019-08-30 NOTE — PROGRESS NOTES
Daily Note     Today's date: 2019  Patient name: Darryle Pals  : 1955  MRN: [id number]  Referring provider: Caridad Alex MD  Dx:   Encounter Diagnosis     ICD-10-CM    1  Sprain of ligament of right ankle, subsequent encounter S93 880D                   Subjective: Patient offers no new complaints  Notes compliance with HEP  Objective: See treatment diary below        Assessment: Tolerated treatment well  Challenged with SLS  Cueing throughout for form maintenance with good carryover  Patient would benefit from continued PT  Plan: Continue per plan of care        Precautions: MS, OA    Daily Treatment Diary    Manual 2019      MWM standing dorsiflexion mob 5' 5' LH      sittind DF posterior talar mob  3' [facility]      Ankle Exercises         Bike nv 5' 5'               Ankle TB inv/ev/PF* GTB 3x15 GTB 3x15 GTB 3x15 ea      Ankle DF w toes curled GTB 3x15 GTB 3x15 GTB 3x15      Bridges nv nv 2x10                        ROM         Gastroc stretch* 3x30" 3x30" 3x30"      Soleus stretch                                             TA/Closed Chain                  Standing eccentric heel raises         Heel/toe raises nv 2x10 ea 2x10 ea      TG Squats         Band walks eversion biased         Step ups (involved LE up, uninvolved down)                  Step overs nv x15 2in exxagerated knee flexion 10x      Mini squats w ue support         STS from chair/low mat nv nv 10x      Squats w target         Goblet squats                  Balance/Proprio         BAPS board sitting nv x20 cw/ccw 20x ea      FTEO         Tandem         SL* 10"x10 10"x10 10x10"      Tandem ambulation         Marches                  SL skaters         SL ball toss         SL on foam         SL skaters on foam                  * = on hep

## 2019-09-04 ENCOUNTER — OFFICE VISIT (OUTPATIENT)
Dept: PHYSICAL THERAPY | Facility: REHABILITATION | Age: 64
End: 2019-09-04
Payer: COMMERCIAL

## 2019-09-04 DIAGNOSIS — S93.401D SPRAIN OF LIGAMENT OF RIGHT ANKLE, SUBSEQUENT ENCOUNTER: Primary | ICD-10-CM

## 2019-09-04 PROCEDURE — 97112 NEUROMUSCULAR REEDUCATION: CPT | Performed by: PHYSICAL THERAPIST

## 2019-09-04 PROCEDURE — 97530 THERAPEUTIC ACTIVITIES: CPT | Performed by: PHYSICAL THERAPIST

## 2019-09-04 PROCEDURE — 97110 THERAPEUTIC EXERCISES: CPT | Performed by: PHYSICAL THERAPIST

## 2019-09-04 NOTE — PROGRESS NOTES
Daily Note     Today's date: 2019  Patient name: Rosalia Mera  : 1955  MRN: [id number]  Referring provider: Rudy Castaneda MD  Dx:   Encounter Diagnosis     ICD-10-CM    1  Sprain of ligament of right ankle, subsequent encounter S93 401D        Start Time: 830  Stop Time: 0915  Total time in clinic (min): 45 minutes    Subjective: Gatito Terrell reports that her ankle feels similar to when she first came in  "I still have trouble with the steps "       Objective: See treatment diary below      Assessment: Tolerated treatment well  Patient demonstrated fatigue post treatment, exhibited good technique with therapeutic exercises and would benefit from continued PT  Patient's primary complaint is lack of control when descending her steps at home, this is due to decreased range of motion partially however, greatest limitation is due to lack of soleous strength being able to eccentrically control the dorsiflexion of her tibia when stepping down  Provided additional soleous focussed strengthening exercises to home  Plan: Continue per plan of care             Precautions: MS, OA    Daily Treatment Diary    Manual 2019 9/4     MWM standing dorsiflexion mob 5' 5' LH nv     sittind DF posterior talar mob  3' [facility]      Ankle Exercises         Bike nv 5' 5' 5'              Ankle TB inv/ev/PF* GTB 3x15 GTB 3x15 GTB 3x15 ea BTB 3x15     Ankle TB PF soleous    BTB 3x15     Ankle DF w toes curled GTB 3x15 GTB 3x15 GTB 3x15      Bridges nv nv 2x10                        ROM         Gastroc stretch* 3x30" 3x30" 3x30" 3x30"     Soleus stretch                                             TA/Closed Chain                  Standing eccentric heel raises         Heel raises nv 2x10 ea 2x10 ea nv     Seated heel raise w weight    nv     Band walks eversion biased         Step ups (involved LE up, uninvolved down)                  Step overs nv x15 2in exxagerated knee flexion 10x 4" 3x10, 6" 2x10     Mini squats w ue support         STS from chair/low mat nv nv 10x nv     Squats w target         Goblet squats                  Balance/Proprio         BAPS board sitting nv x20 cw/ccw 20x ea dc     FTEO         Tandem         SL* 10"x10 10"x10 10x10" nv     Tandem ambulation         [facility]                  SL skaters         SL ball toss         SL on foam         SL skaters on foam                  * = on hep

## 2019-09-11 ENCOUNTER — OFFICE VISIT (OUTPATIENT)
Dept: PHYSICAL THERAPY | Facility: REHABILITATION | Age: 64
End: 2019-09-11
Payer: COMMERCIAL

## 2019-09-11 DIAGNOSIS — S93.401D SPRAIN OF LIGAMENT OF RIGHT ANKLE, SUBSEQUENT ENCOUNTER: Primary | ICD-10-CM

## 2019-09-11 PROCEDURE — 97110 THERAPEUTIC EXERCISES: CPT

## 2019-09-11 PROCEDURE — 97530 THERAPEUTIC ACTIVITIES: CPT

## 2019-09-11 PROCEDURE — 97112 NEUROMUSCULAR REEDUCATION: CPT

## 2019-09-11 NOTE — PROGRESS NOTES
Daily Note     Today's date: 2019  Patient name: Selin Martin  : 1955  MRN: [id number]  Referring provider: Palmer Quintero MD  Dx:   Encounter Diagnosis     ICD-10-CM    1  Sprain of ligament of right ankle, subsequent encounter S93 401D        Start Time: 830  Stop Time: 930  Total time in clinic (min): 60 minutes    Subjective: Patient reports soreness after previous session stating "lasted about a day " Patient reports compliance with HEP and is to follow up with MD this Monday  See re-eval         Objective: See treatment diary below      Assessment: Tolerated treatment well  Patient demonstrated fatigue post treatment, exhibited good technique with therapeutic exercises and would benefit from continued PT Pain noted with completion of step overs on 6'', unable to complete, some relief with use of 4 inch step with continuous cues for heel strike upon descending with LLE, carry over 50% of the time  See re-eval        Plan: Continue per plan of care  Progress treatment as tolerated         Precautions: MS, OA    Daily Treatment Diary    Manual 2019    MWM standing dorsiflexion mob 5' 5' LH nv     sittind DF posterior talar mob  3' [address]      Ankle Exercises         Bike nv 5' 5' 5' 5'             Ankle TB inv/ev/PF* GTB 3x15 GTB 3x15 GTB 3x15 ea BTB 3x15 BTB 3x 15    Ankle TB PF soleous    BTB 3x15 BTB 3x15     Ankle DF w toes curled GTB 3x15 GTB 3x15 GTB 3x15      Bridges nv nv 2x10  3x10                       ROM         Gastroc stretch* 3x30" 3x30" 3x30" 3x30" 3x30''    Soleus stretch                                             TA/Closed Chain                  Standing eccentric heel raises         Heel raises nv 2x10 ea 2x10 ea nv 3x10     Seated heel raise w weight    nv 12# 3x12     Band walks eversion biased         Step ups (involved LE up, uninvolved down)                  Step overs nv x15 2in exxagerated knee flexion 10x 4" 3x10, 6" 2x10 4'' 3x10     Mini squats w ue support         STS from chair/low mat nv nv 10x nv nv    Squats w target         Goblet squats                  Balance/Proprio         BAPS board sitting nv x20 cw/ccw 20x ea dc     FTEO         Tandem         SL* 10"x10 10"x10 10x10" nv 10x10''    Tandem ambulation         9 [facility]                  SL skaters         SL ball toss         SL on foam         SL skaters on foam                  * = on hep

## 2019-09-11 NOTE — PROGRESS NOTES
PT Re-Evaluation     Today's date: 2019  Patient name: Rosalia Mera  : 1955  MRN: [id number]  Referring provider: Rudy Castaneda MD  Dx:   Encounter Diagnosis     ICD-10-CM    1  Sprain of ligament of right ankle, subsequent encounter S93 401D        Start Time: 830  Stop Time: 930  Total time in clinic (min): 60 minutes    Assessment  Assessment details: Rosalia Mera has been compliant with attending PT and home exercise program since initial eval   Cushing  has made improvements in objective data since initial eval but is still limited compared to prior level of function  Cushing continues with above listed impairments and would benefit from additional skilled PT to address these deficits to return to prior level of function  Susan's chief complaint is with difficult descending steps I believe this is due to her decreased soleous strength making it difficult for her to control her ankle eccentrically via the soleous while descending the steps  I do not know if this decrease in strength is due to her MS or if it is more due to compensations during her ankle sprain recovery     Impairments: abnormal muscle firing, abnormal or restricted ROM, activity intolerance, impaired physical strength, lacks appropriate home exercise program and pain with function  Barriers to therapy: $40 copay  Understanding of Dx/Px/POC: good   Prognosis: good    Goals  Impairment Goals  - Decrease pain 0/10  - Improve ROM to 20 degrees dorsiflexion progressing  - Increase strength to 5/5 throughout    Functional Goals  - Return to Prior Level of Function  - Increase Functional Status Measure to: 70 progressing  - Patient will be independent with HEP progressing  -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 as well as nithya     Plan  Patient would benefit from: skilled PT  Planned therapy interventions: joint mobilization, manual therapy, patient education, postural training, activity modification, abdominal trunk stabilization, body mechanics training, flexibility, functional ROM exercises, graded exercise, home exercise program, neuromuscular re-education, strengthening, stretching, therapeutic activities and therapeutic exercise  Frequency: 1x week  Duration in weeks: 8  Plan of Care beginning date: 2019  Plan of Care expiration date: 10/16/2019  Treatment plan discussed with: patient        Subjective Evaluation    History of Present Illness  Mechanism of injury: Daren Son has been seen for total of 5 visits for outpatient physical therapy  Patient rates overall improvement since beginning PT 50%  Patient's global rating of change is " Moderately better (4) " Patient reports improvements with increased range of motion and strength  Patient reports most difficulty with descending steps  Pain  Current pain ratin  At worst pain rating: 3  Location: R anterior ankle  Quality: dull ache  Relieving factors: rest  Aggravating factors: stair climbing, walking, standing, lifting and running  Progression: improved    Social Support    Employment status: not working  Patient Goals  Patient goals for therapy: decreased pain, increased motion, increased strength and return to sport/leisure activities          Objective     Tenderness     Right Ankle/Foot   No tenderness in the Achilles insertion, anterior ankle, anterior talofibular ligament, deltoid ligament, dorsum foot, mid-plantar aspect and navicular  Joint Play   Left Ankle/Foot  Joints within functional limits are the fibular head  Right Ankle/Foot  Hypomobile in the fibular head       Strength/Myotome Testing     Left Ankle/Foot   Dorsiflexion: 5  Plantar flexion: 5  Inversion: 5  Eversion: 5    Right Ankle/Foot   Dorsiflexion: 4-  Plantar flexion: 4  Inversion: 4+  Eversion: 4+    Additional Strength Details  soleous mmt 4/5 right 5/5 left     Tests     Right Ankle/Foot   Negative for anterior drawer (laxity detected however felt symmetrical to L foot)         Flowsheet Rows      Most Recent Value   PT/OT G-Codes   Current Score  55   Projected Score  69

## 2019-09-16 ENCOUNTER — OFFICE VISIT (OUTPATIENT)
Dept: OBGYN CLINIC | Facility: CLINIC | Age: 64
End: 2019-09-16
Payer: COMMERCIAL

## 2019-09-16 VITALS
WEIGHT: 189.8 LBS | HEIGHT: 64 IN | DIASTOLIC BLOOD PRESSURE: 63 MMHG | HEART RATE: 75 BPM | SYSTOLIC BLOOD PRESSURE: 93 MMHG | BODY MASS INDEX: 32.4 KG/M2

## 2019-09-16 DIAGNOSIS — S93.401D SPRAIN OF RIGHT ANKLE, UNSPECIFIED LIGAMENT, SUBSEQUENT ENCOUNTER: Primary | ICD-10-CM

## 2019-09-16 PROCEDURE — 99213 OFFICE O/P EST LOW 20 MIN: CPT | Performed by: ORTHOPAEDIC SURGERY

## 2019-09-16 NOTE — PROGRESS NOTES
Assessment/Plan:  1  Sprain of right ankle, unspecified ligament, subsequent encounter  MRI ankle/heel right  wo contrast       Scribe Attestation    I,:   Brittany Silver am acting as a scribe while in the presence of the attending physician :        I,:   Merly Bill MD personally performed the services described in this documentation    as scribed in my presence :          Unfortunately, Marilee oCreas continues to have discomfort about her right ankle after 4 weeks of formal physical therapy  I do believe we should order an MRI to assess the ligaments surrounding her mortise  We provided her with a prescription for this today in the office  We advised her to continue to do formal physical therapy to continue building strength about her ankle  She may call us after at least 2 days after she gets her MRI to tell her the results if she does not want to wait until her follow-up to know it  I did review her physical therapy notes today in the office  We will see her back in the office after her MRI to review the results and re-evaluate  Subjective:   Gabrielle Kahn is a 60 y o  female who presents to the office today for follow-up evaluation of right ankle sprain, status post 3 months from injury  She states she has been compliant with formal physical therapy as well as her home exercise program, however she continues to have discomfort about her ankle  At today's visit, she notes a deep pain about her ankle after she ambulates for a long period of time, as well as an aching pain throughout the night  She does note difficulty with descending stairs  She does note she has MS, which affects her right leg and foot  She is concerned because her right foot has a different color compared bilaterally  She denies any radicular symptoms  Review of Systems   Constitutional: Negative for chills, fever and unexpected weight change  HENT: Negative for hearing loss, nosebleeds and sore throat      Eyes: Negative for pain, redness and visual disturbance  Respiratory: Negative for cough, shortness of breath and wheezing  Cardiovascular: Negative for chest pain, palpitations and leg swelling  Gastrointestinal: Negative for abdominal pain, nausea and vomiting  Endocrine: Negative for polydipsia and polyuria  Genitourinary: Negative for dysuria and hematuria  Musculoskeletal: Positive for arthralgias and joint swelling  See HPI   Skin: Negative for rash and wound  Neurological: Negative for dizziness, numbness and headaches  Psychiatric/Behavioral: Negative for decreased concentration and suicidal ideas  The patient is not nervous/anxious  Past Medical History:   Diagnosis Date    Abnormal Pap smear of cervix 1990    Endometriosis     GERD (gastroesophageal reflux disease)     Multiple sclerosis (HCC)     Osteoarthritis     Phlebitis     Thyroid nodule 9/11/2018    Varicose vein        Past Surgical History:   Procedure Laterality Date    CERVIX SURGERY      ENDOVENOUS ABLATION SAPHENOUS VEIN W/ LASER      of incompetent vein laser last assessed 10/19/2015    GYNECOLOGIC CRYOSURGERY      early 's cervix per allscripts    OOPHORECTOMY      OOPHORECTOMY Right     NM COLONOSCOPY FLX DX W/COLLJ SPEC WHEN PFRMD N/A 11/8/2018    Procedure: COLONOSCOPY;  Surgeon: Lita Merritt MD;  [facility]: [facility];   Service: Gastroenterology    NM PHLEB VEINS - EXTREM - TO 20 Right 3/22/2016    Procedure: Right leg multiple stab phlebectomies ;  Surgeon: Salina Sánchez MD;  [facility]: [facility];  Service: Vascular    STERILIZATION      bilateral partial salpingectomy    TONSILLECTOMY AND ADENOIDECTOMY      age 13 per allscripts    TUBAL LIGATION      US GUIDED THYROID BIOPSY  10/1/2018    VARICOSE VEIN SURGERY         Family History   Problem Relation Age of Onset    Diabetes Mother     Dementia Mother     Cancer Mother 80    Alzheimer's disease Mother     Diabetes Brother     Alcohol abuse Father     No Known Problems Brother     Diabetes Maternal Grandmother     Diabetes Other     Heart disease Other     No Known Problems Daughter     No Known Problems Maternal Grandfather     No Known Problems Paternal Grandmother     No Known Problems Paternal Grandfather        Social History     Occupational History    Not on file   Tobacco Use    Smoking status: Former Smoker    Smokeless tobacco: Never Used    Tobacco comment: denied history of current every day smoker, denied history of current smoker per allscripts   Substance and Sexual Activity    Alcohol use: No     Comment: social per allscripts    Drug use: No    Sexual activity: Yes     Partners: Male         Current Outpatient Medications:     b complex vitamins tablet, Take 1 tablet by mouth daily, Disp: , Rfl:     BIOTIN PO, Take by mouth daily, Disp: , Rfl:     calcium acetate (PHOSLO) 667 mg capsule, Take 1,334 mg by mouth daily  , Disp: , Rfl:     cholecalciferol (VITAMIN D3) 1,000 units tablet, Take 1,000 Units by mouth daily, Disp: , Rfl:     clemastine (TAVIST) 2 68 MG TABS, Take 2 68 mg by mouth, Disp: , Rfl:     metroNIDAZOLE (METROCREAM) 0 75 % cream, APPLY TOPICALLY TO FACE TWICE DAILY, Disp: , Rfl: 1    ocrelizumab (OCREVUS) 300 MG/10ML SOLN, Infuse into a venous catheter once, Disp: , Rfl:     famciclovir (FAMVIR) 500 mg tablet, Take 3 tablets (1,500 mg total) by mouth once for 1 dose (Patient taking differently: Take 1,500 mg by mouth as needed ), Disp: 3 tablet, Rfl: 0    No Known Allergies    Objective:  Vitals:    09/16/19 0843   BP: 93/63   Pulse: 75       Right Ankle Exam     Tenderness   The patient is experiencing no tenderness    Swelling: none    Range of Motion   Dorsiflexion: 10   Plantar flexion: 20     Tests   Anterior drawer: negative  Varus tilt: negative    Other   Erythema: absent  Scars: absent  Sensation: normal  Pulse: present (2+ dorsal pedal)     Comments:    Squeeze (+)    Subtalar Arch 20 degrees          Observations     Right Ankle/Foot   Negative for adhesive scar  Physical Exam   Constitutional: She is oriented to person, place, and time  She appears well-developed and well-nourished  HENT:   Head: Normocephalic and atraumatic  Eyes: Pupils are equal, round, and reactive to light  Conjunctivae are normal  Right eye exhibits no discharge  Left eye exhibits no discharge  Neck: Normal range of motion  Neck supple  Cardiovascular: Normal rate and intact distal pulses  Pulmonary/Chest: Effort normal  No respiratory distress  Musculoskeletal:   As noted in HPI  Neurological: She is alert and oriented to person, place, and time  Skin: Skin is warm and dry  Vitals reviewed

## 2019-09-30 ENCOUNTER — APPOINTMENT (OUTPATIENT)
Dept: PHYSICAL THERAPY | Facility: REHABILITATION | Age: 64
End: 2019-09-30
Payer: COMMERCIAL

## 2019-10-01 ENCOUNTER — TELEPHONE (OUTPATIENT)
Dept: OBGYN CLINIC | Facility: CLINIC | Age: 64
End: 2019-10-01

## 2019-10-01 ENCOUNTER — HOSPITAL ENCOUNTER (OUTPATIENT)
Dept: MRI IMAGING | Facility: HOSPITAL | Age: 64
Discharge: HOME | End: 2019-10-01
Attending: ORTHOPAEDIC SURGERY
Payer: COMMERCIAL

## 2019-10-01 ENCOUNTER — TRANSCRIBE ORDERS (OUTPATIENT)
Dept: ADMINISTRATIVE | Facility: HOSPITAL | Age: 64
End: 2019-10-01

## 2019-10-01 ENCOUNTER — TELEPHONE (OUTPATIENT)
Dept: OBGYN CLINIC | Facility: HOSPITAL | Age: 64
End: 2019-10-01

## 2019-10-01 DIAGNOSIS — S93.401D SPRAIN OF RIGHT ANKLE, UNSPECIFIED LIGAMENT, SUBSEQUENT ENCOUNTER: ICD-10-CM

## 2019-10-01 PROCEDURE — 73721 MRI JNT OF LWR EXTRE W/O DYE: CPT

## 2019-10-01 NOTE — TELEPHONE ENCOUNTER
We have this problem a few times with trainers ordering them  Rajendra Theoodre and I both looked at the test was already performed it says you are authorizing provider  YOu may need to go into patients chart and see if you need to cosign  If the test was not yet performed, I could have just put in an order under my name, but it was already done with you as ordering provider

## 2019-10-01 NOTE — TELEPHONE ENCOUNTER
Patient sees Dr Jayme Mcpherson  Radiology is calling because the patient has an MRI scheduled for this morning but the MRI script does not state that it was ordered by the doctor  They need the script to be changed

## 2019-10-01 NOTE — TELEPHONE ENCOUNTER
Attempted to call Patient voicemail box is full, unable to leave a voicemail  I will try again later  ----- Message from Adrienne Bal PA-C sent at 10/1/2019 10:24 AM EDT -----  osteochondral (bone/cartilage) injury of the talar dome (bone in the foot) and several sprains, as well as plantar fasciitis  Nonurgent FU in office advised    ----- Message -----  From: Interface, Radiology Results In  Sent: 10/1/2019  10:17 AM EDT  To: Lalo Gracia MD

## 2019-10-04 ENCOUNTER — OFFICE VISIT (OUTPATIENT)
Dept: OBGYN CLINIC | Facility: CLINIC | Age: 64
End: 2019-10-04
Payer: COMMERCIAL

## 2019-10-04 VITALS
DIASTOLIC BLOOD PRESSURE: 62 MMHG | BODY MASS INDEX: 32.4 KG/M2 | WEIGHT: 189.8 LBS | HEIGHT: 64 IN | HEART RATE: 81 BPM | SYSTOLIC BLOOD PRESSURE: 94 MMHG

## 2019-10-04 DIAGNOSIS — S93.401D SPRAIN OF RIGHT ANKLE, UNSPECIFIED LIGAMENT, SUBSEQUENT ENCOUNTER: Primary | ICD-10-CM

## 2019-10-04 PROCEDURE — 99214 OFFICE O/P EST MOD 30 MIN: CPT | Performed by: ORTHOPAEDIC SURGERY

## 2019-10-04 NOTE — PROGRESS NOTES
Assessment/Plan:  1  Sprain of right ankle, unspecified ligament, subsequent encounter  Ambulatory referral to Physical Therapy       Scribe Attestation    I,:   Danika Fernández MA am acting as a scribe while in the presence of the attending physician :        I,:   Jalen Reyna MD personally performed the services described in this documentation    as scribed in my presence :          Douglas Modi and I engaged in a discussion today in the office in regards to her right ankle sprain and her MRI  MRI was reviewed with the patient today in office which demonstrates a grade 1 lateral ankle sprain as well as an small osteochondral lesion  Based on imaging I do not feel that Douglas Modi will require surgical intervention  I would like to continue to treat this conservatively with physical therapy  Patient was referred to formal physical therapy for another 6 weeks to work on strengthening and range of motion exercises  She may return to activities as tolerated  I will see her back in the office in 6 weeks time for re-evaluation  Subjective:   Nessa Matthews is a 60 y o  female who presents to the office today for follow-up evaluation of her right ankle  Patient previously sustained an ankle injury on 06/22/2019 when she missed the last step and rolled her ankle  Patient states she has discontinued formal physical therapy at this time however she did attend 1 month of sessions  She states she does not feel like she made great improvement with physical therapy  She has continued to modify her activities  Patient states she does have pain intermittently when walking on hard surfaces or ambulating up and down stairs  She denies any numbness and tingling  She denies any new injury  She does have a known history of MS affect the right lower extremity   She occasionally takes ibuprofen for pain relief  Review of Systems   Constitutional: Negative for chills, fever and unexpected weight change  HENT: Negative for hearing loss, nosebleeds and sore throat  Eyes: Negative for pain, redness and visual disturbance  Respiratory: Negative for cough, shortness of breath and wheezing  Cardiovascular: Negative for chest pain, palpitations and leg swelling  Gastrointestinal: Negative for abdominal pain, nausea and vomiting  Endocrine: Negative for polydipsia and polyuria  Genitourinary: Negative for dyspareunia and hematuria  Musculoskeletal: Positive for arthralgias and myalgias  Negative for joint swelling  Skin: Negative for rash and wound  Neurological: Negative for dizziness, numbness and headaches  Psychiatric/Behavioral: Negative for decreased concentration and suicidal ideas  The patient is not nervous/anxious  Past Medical History:   Diagnosis Date    Abnormal Pap smear of cervix 1990    Endometriosis     GERD (gastroesophageal reflux disease)     Multiple sclerosis (HCC)     Osteoarthritis     Phlebitis     Thyroid nodule 9/11/2018    Varicose vein        Past Surgical History:   Procedure Laterality Date    CERVIX SURGERY      ENDOVENOUS ABLATION SAPHENOUS VEIN W/ LASER      of incompetent vein laser last assessed 10/19/2015    GYNECOLOGIC CRYOSURGERY      early 's cervix per allscripts    OOPHORECTOMY      OOPHORECTOMY Right     TX COLONOSCOPY FLX DX W/COLLJ SPEC WHEN PFRMD N/A 11/8/2018    Procedure: COLONOSCOPY;  Surgeon: Joseph Vick MD;  Location: [facility];   Service: Gastroenterology    TX PHLEB VEINS - EXTREM - TO 20 Right 3/22/2016    Procedure: Right leg multiple stab phlebectomies ;  Surgeon: Trudy Frias MD;  [facility];  Service: Vascular    STERILIZATION      bilateral partial salpingectomy    TONSILLECTOMY AND ADENOIDECTOMY      age 13 per allscripts    TUBAL LIGATION      US GUIDED THYROID BIOPSY  10/1/2018    VARICOSE VEIN SURGERY         Family History   Problem Relation Age of Onset    Diabetes Mother     Dementia Mother    Maurice Brito Mother 80    Alzheimer's disease Mother     Diabetes Brother     Alcohol abuse Father     No Known Problems Brother     Diabetes Maternal Grandmother     Diabetes Other     Heart disease Other     No Known Problems Daughter     No Known Problems Maternal Grandfather     No Known Problems Paternal Grandmother     No Known Problems Paternal Grandfather        Social History     Occupational History    Not on file   Tobacco Use    Smoking status: Former Smoker    Smokeless tobacco: Never Used    Tobacco comment: denied history of current every day smoker, denied history of current smoker per allscripts   Substance and Sexual Activity    Alcohol use: No     Comment: social per allscripts    Drug use: No    Sexual activity: Yes     Partners: Male         Current Outpatient Medications:     b complex vitamins tablet, Take 1 tablet by mouth daily, Disp: , Rfl:     BIOTIN PO, Take by mouth daily, Disp: , Rfl:     calcium acetate (PHOSLO) 667 mg capsule, Take 1,334 mg by mouth daily  , Disp: , Rfl:     cholecalciferol (VITAMIN D3) 1,000 units tablet, Take 1,000 Units by mouth daily, Disp: , Rfl:     clemastine (TAVIST) 2 68 MG TABS, Take 2 68 mg by mouth, Disp: , Rfl:     metroNIDAZOLE (METROCREAM) 0 75 % cream, APPLY TOPICALLY TO FACE TWICE DAILY, Disp: , Rfl: 1    ocrelizumab (OCREVUS) 300 MG/10ML SOLN, Infuse into a venous catheter once, Disp: , Rfl:     famciclovir (FAMVIR) 500 mg tablet, Take 3 tablets (1,500 mg total) by mouth once for 1 dose (Patient taking differently: Take 1,500 mg by mouth as needed ), Disp: 3 tablet, Rfl: 0    No Known Allergies    Objective:  Vitals:    10/04/19 0811   BP: 94/62   Pulse: 81       Right Ankle Exam     Tenderness   The patient is experiencing no tenderness    Swelling: none    Range of Motion   Dorsiflexion: 15   Plantar flexion: 30     Muscle Strength   Dorsiflexion:  5/5  Plantar flexion:  5/5    Tests   Anterior drawer: negative  Varus tilt: negative    Other   Erythema: absent  Scars: absent  Sensation: normal  Pulse: present     Comments:  20° subtalar arc          Observations     Right Ankle/Foot   Negative for adhesive scar  Strength/Myotome Testing     Right Ankle/Foot   Dorsiflexion: 5  Plantar flexion: 5      Physical Exam   Constitutional: She is oriented to person, place, and time  She appears well-developed and well-nourished  HENT:   Head: Normocephalic and atraumatic  Eyes: Pupils are equal, round, and reactive to light  Conjunctivae are normal    Neck: Normal range of motion  Neck supple  Cardiovascular: Normal rate and intact distal pulses  Pulmonary/Chest: Effort normal  No respiratory distress  Musculoskeletal:   As noted in HPI   Neurological: She is alert and oriented to person, place, and time  Skin: Skin is warm and dry  Psychiatric: She has a normal mood and affect  Her behavior is normal    Nursing note and vitals reviewed  I have personally reviewed pertinent films in PACS and my interpretation is as follows:  MRI of right ankle obtained on 10/01/2019 which demonstrates grade 1 lateral ankle sprain as well as a small medial talar dome osteochondral lesion with evidence of bony contusions at the medial talus and medial malleolus

## 2019-10-22 NOTE — PROGRESS NOTES
PT Discharge    Today's date: 10/22/2019  Patient name: Walter Sherman  : 1955  MRN: [id number]  Referring provider: Sammi Marie MD  Dx:   Encounter Diagnosis     ICD-10-CM    1  Sprain of ligament of right ankle, subsequent encounter S93 401D PT plan of care cert/re-cert       Start Time: 830  Stop Time: 930  Total time in clinic (min): 60 minutes    Assessment/Plan  Walter Sherman has not returned since last appointment, unable to perform objective measures since then  It is assumed they have returned to prior level of function and do not desire additional physical therapy  At this time, Walter Sherman will be discharged from physical therapy services      Subjective    Objective    Flowsheet Rows      Most Recent Value   PT/OT G-Codes   Current Score  55   Projected Score  69

## 2020-01-06 DIAGNOSIS — B00.1 HERPES SIMPLEX LABIALIS: ICD-10-CM

## 2020-01-06 RX ORDER — FAMCICLOVIR 500 MG/1
1500 TABLET, FILM COATED ORAL ONCE
Qty: 3 TABLET | Refills: 0 | Status: SHIPPED | OUTPATIENT
Start: 2020-01-06 | End: 2020-03-24

## 2020-02-03 RX ORDER — FAMCICLOVIR 500 MG/1
TABLET, FILM COATED ORAL
Qty: 3 TABLET | Refills: 0 | OUTPATIENT
Start: 2020-02-03

## 2020-03-24 DIAGNOSIS — B00.1 HERPES SIMPLEX LABIALIS: ICD-10-CM

## 2020-03-24 RX ORDER — FAMCICLOVIR 500 MG/1
TABLET, FILM COATED ORAL
Qty: 3 TABLET | Refills: 11 | Status: SHIPPED | OUTPATIENT
Start: 2020-03-24 | End: 2020-04-23 | Stop reason: SDUPTHER

## 2020-04-23 DIAGNOSIS — B00.1 HERPES SIMPLEX LABIALIS: ICD-10-CM

## 2020-04-24 RX ORDER — FAMCICLOVIR 250 MG/1
TABLET, FILM COATED ORAL
Qty: 6 TABLET | Refills: 11 | Status: SHIPPED | OUTPATIENT
Start: 2020-04-24 | End: 2021-04-24

## 2021-08-25 ENCOUNTER — TELEPHONE (OUTPATIENT)
Dept: GASTROENTEROLOGY | Facility: AMBULARY SURGERY CENTER | Age: 66
End: 2021-08-25

## 2021-08-25 NOTE — TELEPHONE ENCOUNTER
Recall letter sent to patient to call in and schedule her colonoscopy with Dr Rene Meek      Per Notes: 3 yr colonoscopy Hx of polyps    Recall Date: 11/20/2021

## 2021-09-29 ENCOUNTER — TELEPHONE (OUTPATIENT)
Dept: GASTROENTEROLOGY | Facility: CLINIC | Age: 66
End: 2021-09-29

## 2021-09-29 NOTE — TELEPHONE ENCOUNTER
Left message for patient to schedule recall colonoscopy with Dr Anson Krishnamurthy  Direct line given

## 2021-12-10 ENCOUNTER — HOSPITAL ENCOUNTER (OUTPATIENT)
Dept: LAB | Age: 66
Discharge: HOME | End: 2021-12-10

## 2021-12-10 PROCEDURE — 88305 TISSUE EXAM BY PATHOLOGIST: CPT

## 2024-08-05 ENCOUNTER — APPOINTMENT (RX ONLY)
Dept: URBAN - METROPOLITAN AREA CLINIC 25 | Facility: CLINIC | Age: 69
Setting detail: DERMATOLOGY
End: 2024-08-05

## 2024-08-05 DIAGNOSIS — L64.8 OTHER ANDROGENIC ALOPECIA: ICD-10-CM

## 2024-08-05 PROCEDURE — ? COUNSELING

## 2024-08-05 PROCEDURE — ? PRESCRIPTION

## 2024-08-05 PROCEDURE — 99204 OFFICE O/P NEW MOD 45 MIN: CPT

## 2024-08-05 PROCEDURE — ? PRESCRIPTION MEDICATION MANAGEMENT

## 2024-08-05 RX ORDER — MINOXIDIL 2.5 MG/1
TABLET ORAL
Qty: 8 | Refills: 0 | Status: ERX | COMMUNITY
Start: 2024-08-05

## 2024-08-05 RX ORDER — FINASTERIDE 5 MG/1
TABLET, FILM COATED ORAL
Qty: 30 | Refills: 5 | Status: ERX | COMMUNITY
Start: 2024-08-05

## 2024-08-05 RX ADMIN — MINOXIDIL: 2.5 TABLET ORAL at 00:00

## 2024-08-05 RX ADMIN — FINASTERIDE: 5 TABLET, FILM COATED ORAL at 00:00

## 2024-08-05 ASSESSMENT — LOCATION DETAILED DESCRIPTION DERM: LOCATION DETAILED: INFERIOR MID FOREHEAD

## 2024-08-05 ASSESSMENT — LOCATION ZONE DERM: LOCATION ZONE: FACE

## 2024-08-05 ASSESSMENT — LOCATION SIMPLE DESCRIPTION DERM: LOCATION SIMPLE: INFERIOR FOREHEAD

## 2024-08-05 NOTE — PROCEDURE: PRESCRIPTION MEDICATION MANAGEMENT
Initiate Treatment: minoxidil 2.5 mg tablet (Take 1/4 tablet po daily)\\n\\nfinasteride 5 mg tablet : (Take one tablet PO QD)—reviewed theoretical mood or libido changes
Discontinue Regimen: Spironolactone 50 mg—due to lowered BP, renal function, and concern for electrolyte abnormalities/increased urination, switching to LDOM
Detail Level: Zone
Plan: Treatments for hair loss discussed and detailed hand out provided.\\n\\nSupplements: ISDIN, viviscal, or Nutrofol (do not take extra biotin)\\n\\nPRP injections\\n\\nLLRLT\\n\\nToppik hair fibers \\n\\nAppropriate gentle hair techniques \\n\\nScalp massage\\n\\nNutrition \\n\\nBaseline photos \\n\\nFollow up 6 months\\n\\nLDOM is very well tolerated, monitor for lowered BP or altered heart rate, no history of CHF. LDOM will likely be more tolerable and effective than spironolactone due to age.
Render In Strict Bullet Format?: No

## 2024-08-05 NOTE — HPI: HAIR LOSS
Previous Labs: Yes
How Did The Hair Loss Occur?: gradual in onset
How Severe Is Your Hair Loss?: mild
Additional History: Patient stated that this has been going on for a couple of years. She went to her PCP in May and they started her on spironolactone 50 mg once daily. Haven’t tried topical Rogaine yet, afraid her hair will turn yellow

## 2024-08-26 ENCOUNTER — RX ONLY (OUTPATIENT)
Age: 69
Setting detail: RX ONLY
End: 2024-08-26

## 2024-08-26 RX ORDER — MINOXIDIL 2.5 MG/1
TABLET ORAL
Qty: 8 | Refills: 5 | Status: ERX

## 2024-08-28 ENCOUNTER — RX ONLY (OUTPATIENT)
Age: 69
Setting detail: RX ONLY
End: 2024-08-28

## 2024-08-28 RX ORDER — MINOXIDIL 2.5 MG/1
TABLET ORAL
Qty: 24 | Refills: 5 | Status: ERX

## 2025-02-06 ENCOUNTER — APPOINTMENT (OUTPATIENT)
Dept: URBAN - METROPOLITAN AREA CLINIC 25 | Facility: CLINIC | Age: 70
Setting detail: DERMATOLOGY
End: 2025-02-06

## 2025-02-06 DIAGNOSIS — L64.8 OTHER ANDROGENIC ALOPECIA: ICD-10-CM | Status: IMPROVED

## 2025-02-06 PROCEDURE — 99214 OFFICE O/P EST MOD 30 MIN: CPT

## 2025-02-06 PROCEDURE — ? PRESCRIPTION

## 2025-02-06 PROCEDURE — ? PRESCRIPTION MEDICATION MANAGEMENT

## 2025-02-06 PROCEDURE — ? COUNSELING

## 2025-02-06 RX ORDER — MINOXIDIL 2.5 MG/1
TABLET ORAL
Qty: 15 | Refills: 5 | Status: ERX

## 2025-02-06 ASSESSMENT — LOCATION SIMPLE DESCRIPTION DERM: LOCATION SIMPLE: INFERIOR FOREHEAD

## 2025-02-06 ASSESSMENT — LOCATION ZONE DERM: LOCATION ZONE: FACE

## 2025-02-06 ASSESSMENT — LOCATION DETAILED DESCRIPTION DERM: LOCATION DETAILED: INFERIOR MID FOREHEAD

## 2025-02-06 NOTE — PROCEDURE: PRESCRIPTION MEDICATION MANAGEMENT
Modify Regimen: Increase minoxidil to a 1/2 tab: minoxidil 2.5 mg tablet (Take 1/2 tablet po daily)
Discontinue Regimen: Finasteride 5 mg tablet : (Take one tablet PO QD)—pt would like to stop for the time being, if feels she needs to restart ok to start taking again. She doesnt want to be on two medications.
Detail Level: Zone
Plan: Supplements: ISDIN, viviscal, or Nutrofol (do not take extra biotin)\\nPRP injections\\nLLRLT\\nToppik hair fibers \\nAppropriate gentle hair techniques \\nScalp massage\\nNutrition \\nUpdated photos \\nFollow up 6 months\\n\\nImproved, temples primarily. Plan to increase dose to 1/2 tablet. Call if she cannot tolerate
Render In Strict Bullet Format?: No

## 2025-08-07 ENCOUNTER — APPOINTMENT (OUTPATIENT)
Dept: URBAN - METROPOLITAN AREA CLINIC 25 | Facility: CLINIC | Age: 70
Setting detail: DERMATOLOGY
End: 2025-08-07

## 2025-08-07 DIAGNOSIS — L64.8 OTHER ANDROGENIC ALOPECIA: ICD-10-CM | Status: IMPROVED

## 2025-08-07 PROCEDURE — ? PRESCRIPTION

## 2025-08-07 PROCEDURE — ? PRESCRIPTION MEDICATION MANAGEMENT

## 2025-08-07 PROCEDURE — ? COUNSELING

## 2025-08-07 RX ORDER — MINOXIDIL 2.5 MG/1
TABLET ORAL
Qty: 45 | Refills: 3 | Status: ERX

## 2025-08-07 ASSESSMENT — LOCATION DETAILED DESCRIPTION DERM: LOCATION DETAILED: INFERIOR MID FOREHEAD

## 2025-08-07 ASSESSMENT — LOCATION ZONE DERM: LOCATION ZONE: FACE

## 2025-08-07 ASSESSMENT — LOCATION SIMPLE DESCRIPTION DERM: LOCATION SIMPLE: INFERIOR FOREHEAD
